# Patient Record
Sex: MALE | Race: WHITE | NOT HISPANIC OR LATINO | Employment: UNEMPLOYED | URBAN - NONMETROPOLITAN AREA
[De-identification: names, ages, dates, MRNs, and addresses within clinical notes are randomized per-mention and may not be internally consistent; named-entity substitution may affect disease eponyms.]

---

## 2017-05-03 ENCOUNTER — HOSPITAL ENCOUNTER (EMERGENCY)
Facility: HOSPITAL | Age: 57
Discharge: HOME | End: 2017-05-03
Attending: FAMILY MEDICINE | Admitting: FAMILY MEDICINE

## 2017-05-03 VITALS
BODY MASS INDEX: 20.3 KG/M2 | HEART RATE: 78 BPM | TEMPERATURE: 98.5 F | DIASTOLIC BLOOD PRESSURE: 78 MMHG | HEIGHT: 71 IN | RESPIRATION RATE: 18 BRPM | OXYGEN SATURATION: 98 % | WEIGHT: 145 LBS | SYSTOLIC BLOOD PRESSURE: 131 MMHG

## 2017-05-03 DIAGNOSIS — E16.2 HYPOGLYCEMIA: Primary | ICD-10-CM

## 2017-05-03 LAB
ALBUMIN SERPL-MCNC: 4.4 G/DL (ref 3.5–5)
ALBUMIN/GLOB SERPL: 1.5 G/DL (ref 1.5–2.5)
ALP SERPL-CCNC: 58 U/L (ref 40–129)
ALT SERPL W P-5'-P-CCNC: 16 U/L (ref 10–44)
ANION GAP SERPL CALCULATED.3IONS-SCNC: 4.2 MMOL/L (ref 3.6–11.2)
AST SERPL-CCNC: 21 U/L (ref 10–34)
BASOPHILS # BLD AUTO: 0.05 10*3/MM3 (ref 0–0.3)
BASOPHILS NFR BLD AUTO: 0.6 % (ref 0–2)
BILIRUB SERPL-MCNC: 0.3 MG/DL (ref 0.2–1.8)
BUN BLD-MCNC: 18 MG/DL (ref 7–21)
BUN/CREAT SERPL: 14.3 (ref 7–25)
CALCIUM SPEC-SCNC: 9.6 MG/DL (ref 7.7–10)
CHLORIDE SERPL-SCNC: 101 MMOL/L (ref 99–112)
CO2 SERPL-SCNC: 29.8 MMOL/L (ref 24.3–31.9)
CREAT BLD-MCNC: 1.26 MG/DL (ref 0.43–1.29)
DEPRECATED RDW RBC AUTO: 40.9 FL (ref 37–54)
EOSINOPHIL # BLD AUTO: 0.29 10*3/MM3 (ref 0–0.7)
EOSINOPHIL NFR BLD AUTO: 3.3 % (ref 0–5)
ERYTHROCYTE [DISTWIDTH] IN BLOOD BY AUTOMATED COUNT: 12.8 % (ref 11.5–14.5)
GFR SERPL CREATININE-BSD FRML MDRD: 60 ML/MIN/1.73
GLOBULIN UR ELPH-MCNC: 2.9 GM/DL
GLUCOSE BLD-MCNC: 170 MG/DL (ref 70–110)
HCT VFR BLD AUTO: 39.3 % (ref 42–52)
HGB BLD-MCNC: 13 G/DL (ref 14–18)
IMM GRANULOCYTES # BLD: 0.02 10*3/MM3 (ref 0–0.03)
IMM GRANULOCYTES NFR BLD: 0.2 % (ref 0–0.5)
LYMPHOCYTES # BLD AUTO: 1.92 10*3/MM3 (ref 1–3)
LYMPHOCYTES NFR BLD AUTO: 21.7 % (ref 21–51)
MCH RBC QN AUTO: 29.5 PG (ref 27–33)
MCHC RBC AUTO-ENTMCNC: 33.1 G/DL (ref 33–37)
MCV RBC AUTO: 89.1 FL (ref 80–94)
MONOCYTES # BLD AUTO: 0.48 10*3/MM3 (ref 0.1–0.9)
MONOCYTES NFR BLD AUTO: 5.4 % (ref 0–10)
NEUTROPHILS # BLD AUTO: 6.1 10*3/MM3 (ref 1.4–6.5)
NEUTROPHILS NFR BLD AUTO: 68.8 % (ref 30–70)
OSMOLALITY SERPL CALC.SUM OF ELEC: 276 MOSM/KG (ref 273–305)
PLATELET # BLD AUTO: 298 10*3/MM3 (ref 130–400)
PMV BLD AUTO: 9.3 FL (ref 6–10)
POTASSIUM BLD-SCNC: 4.7 MMOL/L (ref 3.5–5.3)
PROT SERPL-MCNC: 7.3 G/DL (ref 6–8)
RBC # BLD AUTO: 4.41 10*6/MM3 (ref 4.7–6.1)
SODIUM BLD-SCNC: 135 MMOL/L (ref 135–153)
WBC NRBC COR # BLD: 8.86 10*3/MM3 (ref 4.5–12.5)

## 2017-05-03 PROCEDURE — 82962 GLUCOSE BLOOD TEST: CPT

## 2017-05-03 PROCEDURE — 80053 COMPREHEN METABOLIC PANEL: CPT | Performed by: PHYSICIAN ASSISTANT

## 2017-05-03 PROCEDURE — 85025 COMPLETE CBC W/AUTO DIFF WBC: CPT | Performed by: PHYSICIAN ASSISTANT

## 2017-05-03 PROCEDURE — 99284 EMERGENCY DEPT VISIT MOD MDM: CPT

## 2017-05-03 PROCEDURE — 36415 COLL VENOUS BLD VENIPUNCTURE: CPT

## 2017-05-03 RX ORDER — SODIUM CHLORIDE 0.9 % (FLUSH) 0.9 %
10 SYRINGE (ML) INJECTION AS NEEDED
Status: DISCONTINUED | OUTPATIENT
Start: 2017-05-03 | End: 2017-05-03

## 2017-05-04 LAB — GLUCOSE BLDC GLUCOMTR-MCNC: 159 MG/DL (ref 70–130)

## 2018-02-19 ENCOUNTER — APPOINTMENT (EMERGENCY)
Dept: CT IMAGING | Facility: HOSPITAL | Age: 58
End: 2018-02-19
Payer: COMMERCIAL

## 2018-02-19 ENCOUNTER — HOSPITAL ENCOUNTER (OUTPATIENT)
Facility: HOSPITAL | Age: 58
Setting detail: OBSERVATION
Discharge: HOME | End: 2018-02-20
Attending: EMERGENCY MEDICINE | Admitting: EMERGENCY MEDICINE
Payer: COMMERCIAL

## 2018-02-19 ENCOUNTER — APPOINTMENT (EMERGENCY)
Dept: RADIOLOGY | Facility: HOSPITAL | Age: 58
End: 2018-02-19
Payer: COMMERCIAL

## 2018-02-19 DIAGNOSIS — J40 BRONCHITIS: ICD-10-CM

## 2018-02-19 DIAGNOSIS — K21.9 GASTROESOPHAGEAL REFLUX DISEASE WITHOUT ESOPHAGITIS: ICD-10-CM

## 2018-02-19 DIAGNOSIS — R05.9 COUGH: ICD-10-CM

## 2018-02-19 DIAGNOSIS — R07.9 CHEST PAIN: ICD-10-CM

## 2018-02-19 DIAGNOSIS — J98.01 BRONCHOSPASM: Primary | ICD-10-CM

## 2018-02-19 PROBLEM — R07.89 CHEST TIGHTNESS: Status: ACTIVE | Noted: 2018-02-19

## 2018-02-19 LAB
ALBUMIN SERPL BCP-MCNC: 3.5 G/DL (ref 3.5–5)
ALP SERPL-CCNC: 72 U/L (ref 46–116)
ALT SERPL W P-5'-P-CCNC: 39 U/L (ref 12–78)
ANION GAP SERPL CALCULATED.3IONS-SCNC: 9 MMOL/L (ref 4–13)
APTT PPP: 29 SECONDS (ref 23–35)
AST SERPL W P-5'-P-CCNC: 22 U/L (ref 5–45)
BASE EX.OXY STD BLDV CALC-SCNC: 78.5 % (ref 60–80)
BASE EXCESS BLDV CALC-SCNC: -0.6 MMOL/L
BASOPHILS # BLD AUTO: 0.03 THOUSANDS/ΜL (ref 0–0.1)
BASOPHILS NFR BLD AUTO: 1 % (ref 0–1)
BILIRUB SERPL-MCNC: 0.6 MG/DL (ref 0.2–1)
BUN SERPL-MCNC: 18 MG/DL (ref 5–25)
CALCIUM SERPL-MCNC: 8.6 MG/DL (ref 8.3–10.1)
CHLORIDE SERPL-SCNC: 106 MMOL/L (ref 100–108)
CK MB SERPL-MCNC: 1.4 % (ref 0–2.5)
CK MB SERPL-MCNC: 2.8 NG/ML (ref 0–5)
CK SERPL-CCNC: 200 U/L (ref 39–308)
CO2 SERPL-SCNC: 28 MMOL/L (ref 21–32)
CREAT SERPL-MCNC: 1.02 MG/DL (ref 0.6–1.3)
DEPRECATED D DIMER PPP: <270 NG/ML (FEU) (ref 0–424)
EOSINOPHIL # BLD AUTO: 0.39 THOUSAND/ΜL (ref 0–0.61)
EOSINOPHIL NFR BLD AUTO: 7 % (ref 0–6)
ERYTHROCYTE [DISTWIDTH] IN BLOOD BY AUTOMATED COUNT: 12.9 % (ref 11.6–15.1)
FLUAV AG SPEC QL: NORMAL
FLUBV AG SPEC QL: NORMAL
GFR SERPL CREATININE-BSD FRML MDRD: 82 ML/MIN/1.73SQ M
GLUCOSE SERPL-MCNC: 103 MG/DL (ref 65–140)
HCO3 BLDV-SCNC: 25 MMOL/L (ref 24–30)
HCT VFR BLD AUTO: 44.8 % (ref 36.5–49.3)
HGB BLD-MCNC: 15.4 G/DL (ref 12–17)
INR PPP: 0.92 (ref 0.86–1.16)
LACTATE SERPL-SCNC: 1.2 MMOL/L (ref 0.5–2)
LYMPHOCYTES # BLD AUTO: 1.92 THOUSANDS/ΜL (ref 0.6–4.47)
LYMPHOCYTES NFR BLD AUTO: 34 % (ref 14–44)
MCH RBC QN AUTO: 29.2 PG (ref 26.8–34.3)
MCHC RBC AUTO-ENTMCNC: 34.4 G/DL (ref 31.4–37.4)
MCV RBC AUTO: 85 FL (ref 82–98)
MONOCYTES # BLD AUTO: 0.55 THOUSAND/ΜL (ref 0.17–1.22)
MONOCYTES NFR BLD AUTO: 10 % (ref 4–12)
NEUTROPHILS # BLD AUTO: 2.78 THOUSANDS/ΜL (ref 1.85–7.62)
NEUTS SEG NFR BLD AUTO: 48 % (ref 43–75)
NT-PROBNP SERPL-MCNC: 18 PG/ML
O2 CT BLDV-SCNC: 17.6 ML/DL
PCO2 BLDV: 44.4 MM HG (ref 42–50)
PH BLDV: 7.37 [PH] (ref 7.3–7.4)
PLATELET # BLD AUTO: 255 THOUSANDS/UL (ref 149–390)
PMV BLD AUTO: 9.8 FL (ref 8.9–12.7)
PO2 BLDV: 42.8 MM HG (ref 35–45)
POTASSIUM SERPL-SCNC: 4.2 MMOL/L (ref 3.5–5.3)
PROT SERPL-MCNC: 6.8 G/DL (ref 6.4–8.2)
PROTHROMBIN TIME: 12.6 SECONDS (ref 12.1–14.4)
RBC # BLD AUTO: 5.28 MILLION/UL (ref 3.88–5.62)
RSV B RNA SPEC QL NAA+PROBE: NORMAL
SODIUM SERPL-SCNC: 143 MMOL/L (ref 136–145)
TROPONIN I SERPL-MCNC: <0.02 NG/ML
WBC # BLD AUTO: 5.67 THOUSAND/UL (ref 4.31–10.16)

## 2018-02-19 PROCEDURE — 99220 PR INITIAL OBSERVATION CARE/DAY 70 MINUTES: CPT | Performed by: INTERNAL MEDICINE

## 2018-02-19 PROCEDURE — 94660 CPAP INITIATION&MGMT: CPT

## 2018-02-19 PROCEDURE — 85025 COMPLETE CBC W/AUTO DIFF WBC: CPT | Performed by: EMERGENCY MEDICINE

## 2018-02-19 PROCEDURE — 71046 X-RAY EXAM CHEST 2 VIEWS: CPT

## 2018-02-19 PROCEDURE — 87798 DETECT AGENT NOS DNA AMP: CPT | Performed by: INTERNAL MEDICINE

## 2018-02-19 PROCEDURE — 84484 ASSAY OF TROPONIN QUANT: CPT | Performed by: EMERGENCY MEDICINE

## 2018-02-19 PROCEDURE — 82553 CREATINE MB FRACTION: CPT | Performed by: EMERGENCY MEDICINE

## 2018-02-19 PROCEDURE — 87040 BLOOD CULTURE FOR BACTERIA: CPT | Performed by: EMERGENCY MEDICINE

## 2018-02-19 PROCEDURE — 36415 COLL VENOUS BLD VENIPUNCTURE: CPT | Performed by: EMERGENCY MEDICINE

## 2018-02-19 PROCEDURE — 71275 CT ANGIOGRAPHY CHEST: CPT

## 2018-02-19 PROCEDURE — 85730 THROMBOPLASTIN TIME PARTIAL: CPT | Performed by: EMERGENCY MEDICINE

## 2018-02-19 PROCEDURE — 83605 ASSAY OF LACTIC ACID: CPT | Performed by: EMERGENCY MEDICINE

## 2018-02-19 PROCEDURE — 84484 ASSAY OF TROPONIN QUANT: CPT | Performed by: INTERNAL MEDICINE

## 2018-02-19 PROCEDURE — 82550 ASSAY OF CK (CPK): CPT | Performed by: EMERGENCY MEDICINE

## 2018-02-19 PROCEDURE — 94760 N-INVAS EAR/PLS OXIMETRY 1: CPT

## 2018-02-19 PROCEDURE — 85610 PROTHROMBIN TIME: CPT | Performed by: EMERGENCY MEDICINE

## 2018-02-19 PROCEDURE — 83880 ASSAY OF NATRIURETIC PEPTIDE: CPT | Performed by: EMERGENCY MEDICINE

## 2018-02-19 PROCEDURE — 80053 COMPREHEN METABOLIC PANEL: CPT | Performed by: EMERGENCY MEDICINE

## 2018-02-19 PROCEDURE — 96365 THER/PROPH/DIAG IV INF INIT: CPT

## 2018-02-19 PROCEDURE — 94640 AIRWAY INHALATION TREATMENT: CPT

## 2018-02-19 PROCEDURE — 84484 ASSAY OF TROPONIN QUANT: CPT | Performed by: PHYSICIAN ASSISTANT

## 2018-02-19 PROCEDURE — 82805 BLOOD GASES W/O2 SATURATION: CPT | Performed by: EMERGENCY MEDICINE

## 2018-02-19 PROCEDURE — 96375 TX/PRO/DX INJ NEW DRUG ADDON: CPT

## 2018-02-19 PROCEDURE — 93005 ELECTROCARDIOGRAM TRACING: CPT

## 2018-02-19 PROCEDURE — 99285 EMERGENCY DEPT VISIT HI MDM: CPT

## 2018-02-19 PROCEDURE — 85379 FIBRIN DEGRADATION QUANT: CPT | Performed by: EMERGENCY MEDICINE

## 2018-02-19 RX ORDER — FLUTICASONE PROPIONATE 50 MCG
1 SPRAY, SUSPENSION (ML) NASAL DAILY
Status: DISCONTINUED | OUTPATIENT
Start: 2018-02-20 | End: 2018-02-20

## 2018-02-19 RX ORDER — HYDROCHLOROTHIAZIDE 12.5 MG/1
12.5 TABLET ORAL DAILY
Status: DISCONTINUED | OUTPATIENT
Start: 2018-02-20 | End: 2018-02-20 | Stop reason: HOSPADM

## 2018-02-19 RX ORDER — LEVALBUTEROL INHALATION SOLUTION 0.63 MG/3ML
0.63 SOLUTION RESPIRATORY (INHALATION) EVERY 4 HOURS PRN
Status: DISCONTINUED | OUTPATIENT
Start: 2018-02-19 | End: 2018-02-20 | Stop reason: HOSPADM

## 2018-02-19 RX ORDER — AZITHROMYCIN 250 MG/1
250 TABLET, FILM COATED ORAL EVERY 24 HOURS
Status: DISCONTINUED | OUTPATIENT
Start: 2018-02-20 | End: 2018-02-20

## 2018-02-19 RX ORDER — BENZONATATE 100 MG/1
100 CAPSULE ORAL 3 TIMES DAILY PRN
Status: DISCONTINUED | OUTPATIENT
Start: 2018-02-19 | End: 2018-02-20 | Stop reason: HOSPADM

## 2018-02-19 RX ORDER — ALBUTEROL SULFATE 2.5 MG/3ML
5 SOLUTION RESPIRATORY (INHALATION) ONCE
Status: COMPLETED | OUTPATIENT
Start: 2018-02-19 | End: 2018-02-19

## 2018-02-19 RX ORDER — ASPIRIN 81 MG/1
162 TABLET, CHEWABLE ORAL ONCE
Status: COMPLETED | OUTPATIENT
Start: 2018-02-19 | End: 2018-02-19

## 2018-02-19 RX ORDER — GUAIFENESIN 600 MG
600 TABLET, EXTENDED RELEASE 12 HR ORAL EVERY 12 HOURS SCHEDULED
Status: DISCONTINUED | OUTPATIENT
Start: 2018-02-19 | End: 2018-02-20

## 2018-02-19 RX ORDER — AZITHROMYCIN 250 MG/1
500 TABLET, FILM COATED ORAL ONCE
Status: COMPLETED | OUTPATIENT
Start: 2018-02-19 | End: 2018-02-19

## 2018-02-19 RX ORDER — HYDROCHLOROTHIAZIDE 12.5 MG/1
12.5 TABLET ORAL DAILY
COMMUNITY

## 2018-02-19 RX ORDER — METHYLPREDNISOLONE SODIUM SUCCINATE 125 MG/2ML
125 INJECTION, POWDER, LYOPHILIZED, FOR SOLUTION INTRAMUSCULAR; INTRAVENOUS ONCE
Status: COMPLETED | OUTPATIENT
Start: 2018-02-19 | End: 2018-02-19

## 2018-02-19 RX ORDER — ACETAMINOPHEN 325 MG/1
650 TABLET ORAL EVERY 6 HOURS PRN
Status: DISCONTINUED | OUTPATIENT
Start: 2018-02-19 | End: 2018-02-20 | Stop reason: HOSPADM

## 2018-02-19 RX ORDER — ALBUTEROL SULFATE 2.5 MG/3ML
2.5 SOLUTION RESPIRATORY (INHALATION) EVERY 6 HOURS PRN
Status: DISCONTINUED | OUTPATIENT
Start: 2018-02-19 | End: 2018-02-19

## 2018-02-19 RX ADMIN — ALBUTEROL SULFATE 5 MG: 2.5 SOLUTION RESPIRATORY (INHALATION) at 09:54

## 2018-02-19 RX ADMIN — ALBUTEROL SULFATE 5 MG: 2.5 SOLUTION RESPIRATORY (INHALATION) at 07:18

## 2018-02-19 RX ADMIN — IOHEXOL 85 ML: 350 INJECTION, SOLUTION INTRAVENOUS at 08:27

## 2018-02-19 RX ADMIN — ACETAMINOPHEN 650 MG: 325 TABLET, FILM COATED ORAL at 19:39

## 2018-02-19 RX ADMIN — ASPIRIN 81 MG 162 MG: 81 TABLET ORAL at 07:16

## 2018-02-19 RX ADMIN — GUAIFENESIN 600 MG: 600 TABLET, EXTENDED RELEASE ORAL at 21:19

## 2018-02-19 RX ADMIN — CEFTRIAXONE 1000 MG: 1 INJECTION, SOLUTION INTRAVENOUS at 08:08

## 2018-02-19 RX ADMIN — METHYLPREDNISOLONE SODIUM SUCCINATE 125 MG: 125 INJECTION, POWDER, FOR SOLUTION INTRAMUSCULAR; INTRAVENOUS at 08:07

## 2018-02-19 RX ADMIN — SODIUM CHLORIDE 1000 ML: 0.9 INJECTION, SOLUTION INTRAVENOUS at 08:07

## 2018-02-19 RX ADMIN — AZITHROMYCIN 500 MG: 250 TABLET, FILM COATED ORAL at 08:08

## 2018-02-19 NOTE — ED PROVIDER NOTES
History  Chief Complaint   Patient presents with    Shortness of Breath     Pt presents to ED for evaluation an dtreatment of shortness of breath and chest tightness for past 2 months  Has been treated with 2 different antiobiotics with no relief of symptoms  Symptoms worse at night     Patient is a 56year old male with 2 months of worsening shortness of breath and cough with green sputum  No travel  (+) pleuritic chest pain  No N/V  No fever  States he has been on two different abx without relief (one of them he believes was augmentin and he does not remember the other abx)  Smokes cigars  Was last seen in this ED on 2/21/15 for angioedema  [facility] website checked on this patient and no Rx found  No h/o asthma or COPD  History provided by:  Patient and spouse   used: No    Shortness of Breath   Associated symptoms: chest pain (pleuritic) and cough    Associated symptoms: no fever and no vomiting        None       Past Medical History:   Diagnosis Date    GERD (gastroesophageal reflux disease)     Renal disorder     kidney stones       Past Surgical History:   Procedure Laterality Date    APPENDECTOMY      HERNIA REPAIR         No family history on file  I have reviewed and agree with the history as documented  Social History   Substance Use Topics    Smoking status: Current Every Day Smoker     Packs/day: 0 00     Types: Cigars    Smokeless tobacco: Never Used    Alcohol use 1 8 oz/week     3 Cans of beer per week        Review of Systems   Constitutional: Negative for fever  Respiratory: Positive for cough and shortness of breath  Cardiovascular: Positive for chest pain (pleuritic)  Gastrointestinal: Negative for nausea and vomiting  All other systems reviewed and are negative        Physical Exam  ED Triage Vitals   Temperature Pulse Respirations Blood Pressure SpO2   02/19/18 0720 02/19/18 0618 02/19/18 0618 02/19/18 0618 02/19/18 0618   98 8 °F (37 1 °C) 66 16 125/68 97 %      Temp Source Heart Rate Source Patient Position - Orthostatic VS BP Location FiO2 (%)   02/19/18 0720 02/19/18 0618 02/19/18 0618 02/19/18 0618 --   Oral Monitor Lying Right arm       Pain Score       02/19/18 0618       2           Orthostatic Vital Signs  Vitals:    02/19/18 0618   BP: 125/68   Pulse: 66   Patient Position - Orthostatic VS: Lying       Physical Exam   Constitutional: He is oriented to person, place, and time  He appears well-developed and well-nourished  He appears distressed (moderate)  HENT:   Head: Normocephalic and atraumatic  Mouth/Throat: Oropharynx is clear and moist    Eyes: No scleral icterus  Neck: Normal range of motion  Neck supple  No JVD present  Cardiovascular: Normal rate, regular rhythm and normal heart sounds  No murmur heard  Pulmonary/Chest: No stridor  He is in respiratory distress  He has wheezes  He has no rales  He exhibits no tenderness  Abdominal: Soft  Bowel sounds are normal  There is no tenderness  Musculoskeletal: He exhibits no edema, tenderness (No calf tenderness) or deformity  Neurological: He is alert and oriented to person, place, and time  Skin: Skin is warm and dry  No rash noted  Psychiatric: He has a normal mood and affect  Nursing note and vitals reviewed        ED Medications  Medications   sodium chloride 0 9 % bolus 1,000 mL (1,000 mL Intravenous New Bag 2/19/18 0807)   albuterol inhalation solution 5 mg (not administered)   albuterol inhalation solution 5 mg (5 mg Nebulization Given 2/19/18 0718)   aspirin chewable tablet 162 mg (162 mg Oral Given 2/19/18 0716)   methylPREDNISolone sodium succinate (Solu-MEDROL) injection 125 mg (125 mg Intravenous Given 2/19/18 0807)   ceftriaxone (ROCEPHIN) 1 g/50 mL in dextrose IVPB (1,000 mg Intravenous New Bag 2/19/18 0808)   azithromycin (ZITHROMAX) tablet 500 mg (500 mg Oral Given 2/19/18 0808)   iohexol (OMNIPAQUE) 350 MG/ML injection (MULTI-DOSE) 100 mL (85 mL Intravenous Given 2/19/18 0827)       Diagnostic Studies  Results Reviewed     Procedure Component Value Units Date/Time    Influenza A/B and RSV by PCR (Indicated for patients > 2 mo of age) [[id number]]     Lab Status:  No result Specimen:  Nasopharyngeal     B-type natriuretic peptide [[id number]]  (Normal) Collected:  02/19/18 0655    Lab Status:  Final result Specimen:  Blood from Arm, Right Updated:  02/19/18 0758     NT-proBNP 18 pg/mL     CKMB [[id number]]  (Normal) Collected:  02/19/18 0655    Lab Status:  Final result Specimen:  Blood from Arm, Right Updated:  02/19/18 0758     CK-MB Index 1 4 %      CK-MB FRACTION 2 8 ng/mL     CK Total with Reflex CKMB [[id number]]  (Normal) Collected:  02/19/18 0655    Lab Status:  Final result Specimen:  Blood from Arm, Right Updated:  02/19/18 0758     Total  U/L     Troponin I [[id number]]  (Normal) Collected:  02/19/18 0655    Lab Status:  Final result Specimen:  Blood from Arm, Right Updated:  02/19/18 0737     Troponin I <0 02 ng/mL     Narrative:         [employer] Chemistry analyzer 99% cutoff is > 0 04 ng/mL in network labs    o cTnI 99% cutoff is useful only when applied to patients in the clinical setting of myocardial ischemia  o cTnI 99% cutoff should be interpreted in the context of clinical history, ECG findings and possibly cardiac imaging to establish correct diagnosis  o cTnI 99% cutoff may be suggestive but clearly not indicative of a coronary event without the clinical setting of myocardial ischemia      Comprehensive metabolic panel [[id number]] Collected:  02/19/18 0655    Lab Status:  Final result Specimen:  Blood from Arm, Right Updated:  02/19/18 0731     Sodium 143 mmol/L      Potassium 4 2 mmol/L      Chloride 106 mmol/L      CO2 28 mmol/L      Anion Gap 9 mmol/L      BUN 18 mg/dL      Creatinine 1 02 mg/dL      Glucose 103 mg/dL      Calcium 8 6 mg/dL      AST 22 U/L      ALT 39 U/L      Alkaline Phosphatase 72 U/L      Total Protein 6 8 g/dL Albumin 3 5 g/dL      Total Bilirubin 0 60 mg/dL      eGFR 82 ml/min/1 73sq m     Narrative:         National Kidney Disease Education Program recommendations are as follows:  GFR calculation is accurate only with a steady state creatinine  Chronic Kidney disease less than 60 ml/min/1 73 sq  meters  Kidney failure less than 15 ml/min/1 73 sq  meters  Lactic acid, plasma [[id number]]  (Normal) Collected:  02/19/18 0655    Lab Status:  Final result Specimen:  Blood from Arm, Right Updated:  02/19/18 0724     LACTIC ACID 1 2 mmol/L     Narrative:         Result may be elevated if tourniquet was used during collection  D-Dimer [[id number]]  (Normal) Collected:  02/19/18 0655    Lab Status:  Final result Specimen:  Blood from Arm, Right Updated:  02/19/18 0718     D-Dimer, Quant <270 ng/ml (FEU)     Protime-INR [[id number]]  (Normal) Collected:  02/19/18 0655    Lab Status:  Final result Specimen:  Blood from Arm, Right Updated:  02/19/18 0714     Protime 12 6 seconds      INR 0 92    APTT [[id number]]  (Normal) Collected:  02/19/18 0655    Lab Status:  Final result Specimen:  Blood from Arm, Right Updated:  02/19/18 0714     PTT 29 seconds     Narrative: Therapeutic Heparin Range = 60-90 seconds    Blood gas, venous [[id number]]  (Normal) Collected:  02/19/18 0655    Lab Status:  Final result Specimen:  Blood from Arm, Right Updated:  02/19/18 0714     pH, Anthony 7 368     pCO2, Anthony 44 4 mm Hg      pO2, Anthony 42 8 mm Hg      HCO3, Anthony 25 0 mmol/L      Base Excess, Anthony -0 6 mmol/L      O2 Content, Anthony 17 6 ml/dL      O2 HGB, VENOUS 78 5 %     Blood culture #1 [[id number]] Collected:  02/19/18 0706    Lab Status:   In process Specimen:  Blood from Hand, Right Updated:  02/19/18 0710    CBC and differential [[id number]]  (Abnormal) Collected:  02/19/18 0655    Lab Status:  Final result Specimen:  Blood from Arm, Right Updated:  02/19/18 0706     WBC 5 67 Thousand/uL      RBC 5 28 Million/uL      Hemoglobin 15 4 g/dL Hematocrit 44 8 %      MCV 85 fL      MCH 29 2 pg      MCHC 34 4 g/dL      RDW 12 9 %      MPV 9 8 fL      Platelets 058 Thousands/uL      Neutrophils Relative 48 %      Lymphocytes Relative 34 %      Monocytes Relative 10 %      Eosinophils Relative 7 (H) %      Basophils Relative 1 %      Neutrophils Absolute 2 78 Thousands/µL      Lymphocytes Absolute 1 92 Thousands/µL      Monocytes Absolute 0 55 Thousand/µL      Eosinophils Absolute 0 39 Thousand/µL      Basophils Absolute 0 03 Thousands/µL     Blood culture #2 [[id number]] Collected:  02/19/18 0655    Lab Status: In process Specimen:  Blood from Arm, Right Updated:  02/19/18 9961                 CTA ED chest PE study   ED Interpretation by Oswaldo Blunt MD (02/19 0850)   FINDINGS:      PULMONARY ARTERIAL TREE:  No pulmonary embolus is seen  LUNGS:  Lungs are clear   There is no tracheal or endobronchial lesion  PLEURA:  Unremarkable  HEART/AORTA:  Heart is not enlarged   No pericardial effusion   Minimal aortic and coronary artery calcification  MEDIASTINUM AND KRYSTYNA:  Unremarkable  CHEST WALL AND LOWER NECK:   Unremarkable  VISUALIZED STRUCTURES IN THE UPPER ABDOMEN:  2 7 cm simple cyst right lobe of liver  OSSEOUS STRUCTURES:  No acute fracture or destructive osseous lesion   Mild thoracic spondylosis   Mild degenerative changes bilateral glenohumeral and acromioclavicular joints  Impression:        No pulmonary embolus  No CT evidence of acute thoracic process  Minimal aortic and coronary artery calcification  Workstation performed: [id number]         Final Result by Roshni Infante MD (02/19 1533)      No pulmonary embolus  No CT evidence of acute thoracic process  Minimal aortic and coronary artery calcification  Workstation performed: [id number]         XR chest 2 views   ED Interpretation by Oswaldo Blunt MD (02/19 0700)   ? retrocardiac infiltrate read by me  Final Result by Samantha Hoyt MD (02/19 5554)      No acute cardiopulmonary disease  Workstation performed: [id number]                    Procedures  ECG 12 Lead Documentation  Date/Time: 2/19/2018 6:26 AM  Performed by: Clayton Phan  Authorized by: Clayton Phan     Indications / Diagnosis:  Chest pain, sob  ECG reviewed by me, the ED Provider: yes    Patient location:  ED  Rate:     ECG rate:  62    ECG rate assessment: normal    Rhythm:     Rhythm: sinus rhythm    Ectopy:     Ectopy: none    QRS:     QRS axis:  Normal    QRS intervals:  Normal  Conduction:     Conduction: normal    ST segments:     ST segments:  Normal  T waves:     T waves: normal             Phone Contacts  ED Phone Contact    ED Course  ED Course as of Feb 19 0900   Mon Feb 19, 2018   1910 Patient feeling better with neb tx      8222 Patient still wheezing so more albuterol neb ordered             HEART Risk Score    Flowsheet Row Most Recent Value   History  2 Filed at: 02/19/2018 0850   ECG  0 Filed at: 02/19/2018 0850   Age  1 Filed at: 02/19/2018 0850   Risk Factors  1 Filed at: 02/19/2018 0850   Troponin  0 Filed at: 02/19/2018 0850   Heart Score Risk Calculator   History  2 Filed at: 02/19/2018 0850   ECG  0 Filed at: 02/19/2018 0850   Age  1 Filed at: 02/19/2018 0850   Risk Factors  1 Filed at: 02/19/2018 0850   Troponin  0 Filed at: 02/19/2018 0850   HEART Score  4 Filed at: 02/19/2018 0850   HEART Score  4 Filed at: 02/19/2018 0850                    MARIA EUGENIA Risk Score    Flowsheet Row Most Recent Value   Age >= 72  0 Filed at: 02/19/2018 0851   Known CAD (stenosis >= 50%)  0 Filed at: 02/19/2018 0851   Recent (<=24 hrs) Service Angina  0 Filed at: 02/19/2018 0851   ST Deviation >= 0 5 mm  0 Filed at: 02/19/2018 0851   3+ CAD Risk Factors (FHx, HTN, HLP, DM, Smoker)  0 Filed at: 02/19/2018 0851   Aspirin Use Past 7 Days  0 Filed at: 02/19/2018 0851   Elevated Cardiac Markers  0 Filed at: 02/19/2018 0851   MARIA EUGENIA Risk Score (Calculated)  0 Filed at: 02/19/2018 1278              MDM  Number of Diagnoses or Management Options  Diagnosis management comments: DDX including but not limited to: pneumonia, bronchitis, pleural effusion, CHF, PE, PTX, ACS, MI, asthma exacerbation, COPD exacerbation, anemia, metabolic abnormality, renal failure  Doubt influenza, dissection, rhabdomyolysis or GI etiology  Amount and/or Complexity of Data Reviewed  Clinical lab tests: ordered and reviewed  Tests in the radiology section of CPT®: ordered and reviewed  Decide to obtain previous medical records or to obtain history from someone other than the patient: yes  Obtain history from someone other than the patient: yes  Review and summarize past medical records: yes  Independent visualization of images, tracings, or specimens: yes      CritCare Time    Disposition  Final diagnoses:   Bronchospasm   Bronchitis   Chest pain     Time reflects when diagnosis was documented in both MDM as applicable and the Disposition within this note     Time User Action Codes Description Comment    2/19/2018  8:59 AM Melina Solis Add [J98 01] Bronchospasm     2/19/2018  8:59 AM Melina Solis Add [J40] Bronchitis     2/19/2018  8:59 AM Melina Solis Add [R07 9] Chest pain       ED Disposition     ED Disposition Condition Comment    Admit  Case was discussed with Dr Francia Tinoco and the patient's admission status was agreed to be Admission Status: observation status to the service of Dr Francia Tinoco   Follow-up Information    None       Patient's Medications    No medications on file     No discharge procedures on file      ED Provider  Electronically Signed by           Giovanna Arredondo MD  02/19/18 0916

## 2018-02-19 NOTE — H&P
H&P- Samm Sheikh 1960, 56 y o  male MRN: [id number]    Unit/Bed#: ED 05 Encounter: [id number]    Primary Care Provider: Rena Oneal MD   Date and time admitted to hospital: 2/19/2018  6:04 AM        * Cough   Assessment & Plan    · Cough and SOB x 2 months despite multiple antibiotics, albuterol inhaler and p o  steroids as an outpatient  · Possible COPD  · Patient reports smoking 1-2 cigars daily over the past 1 year and also notes second hand smoke exposure  · Patient received Solu-Medrol 125 mg in the ED as well as nebulizer treatments and has since noticed some improvement in his symptoms  Patient also received 1 dose of p o  azithromycin and IV ceftriaxone  · Chest x-ray: No acute cardiopulmonary disease  · CTA chest: No pulmonary embolus  No CT evidence of acute thoracic process  Minimal aortic and coronary artery calcification  · Influenza and RSV by PCR negative  · No leukocytosis, patient afebrile  · No wheezing noted at this time  · Hold off on further steroids  · Respiratory protocol  Continue albuterol nebulizer treatments prn  · Obtain sputum culture  · Continue p o  azithromycin  · Pulmonology consult, appreciate input  · Patient will likely need outpatient PFTs  · Pulse ox stable on RA  · Mucinex b i d  and Tessalon Perles prn  Chest tightness   Assessment & Plan    · Patient denies chest pain, notes tightness  Possibly due to suspected COPD  · Received 1 dose of Solu-Medrol in ED  No wheezing noted on exam  No further steroids at this time  · Respiratory protocol  · Obtain troponin x 3   · Obtain echocardiogram          LISA on CPAP   Assessment & Plan    · Continue CPAP HS  Depression   Assessment & Plan    · Continue Rexulti  GERD (gastroesophageal reflux disease)   Assessment & Plan    · Mylanta prn  History of kidney stones   Assessment & Plan    · Patient reports taking HCTZ daily for his h/o kidney stones  VTE Prophylaxis: low risk  Ambulate patient  Code Status: Level 1- Full Code  POLST: POLST form is not discussed and not completed at this time  Anticipated Length of Stay:  Patient will be admitted on an Observation basis with an anticipated length of stay of  < 2 midnights  Justification for Hospital Stay: Cough, chest tightness  Total Time for Visit, including Counseling / Coordination of Care: 45 minutes  Greater than 50% of this total time spent on direct patient counseling and coordination of care  Chief Complaint:   Shortness of breath, cough    History of Present Illness:    Alondra Chaudhry is a 56 y o  male with a history of LISA on CPAP, kidney stones and depression who presents with cough and chest tightness  Patient reports cold-like symptoms over the past 2 months, including SOB, chest tightness, wheezing and cough  He notes that he has been treated with about 4-5 antibiotics, a course of p o  steroids and has been using an albuterol inhaler over the past 2 months without improvement in his symptoms  He reports that his cough has been productive of greenish, yellowish, white sputum  He admits to post nasal drip and has been using a nasal steroid  Patient notes that his cough worsens at night and that he has had difficulty sleeping due to increased SOB and coughing at night  Patient has a history of LSIA and reports compliance with CPAP  He denies chest pain but reports tightness and has noticed that he has been wheezing intermittently  He denies dyspnea on exertion and reports that he is still able to do his usual cardiac exercises on the treadmill  He denies fevers/ chills  Patient reports that he was unable to sleep last night due to his SOB which prompted him to come to the hospital     On arrival to the hospital, blood work was obtained, D-dimer, troponin, lactic acid and BNP were within normal limits   Chest x-ray showed no acute findings and CTA chest was negative for acute thoracic process and negative for PE  EKG was normal sinus rhythm  Per ED reports, patient was noted to have wheezing on exam  He received Solu-Medrol 125 mg IV and albuterol nebulizer treatments  He also received 1 dose of azithromycin and ceftriaxone  He has noticed improvement in his symptoms since presentation  Influenza and RSV are negative  He notes that he smokes 1-2 cigars daily over the past 1 year and had second hand smoke as a kid, his father reportedly smoked  He reports that he had a normal exercise stress test about 5-10 years ago  Review of Systems:    Review of Systems   HENT: Positive for postnasal drip  Respiratory: Positive for cough, chest tightness, shortness of breath and wheezing  All other systems reviewed and are negative  Past Medical and Surgical History:     Past Medical History:   Diagnosis Date    Depression     GERD (gastroesophageal reflux disease)     History of kidney stones     kidney stones    LISA on CPAP        Past Surgical History:   Procedure Laterality Date    APPENDECTOMY      HERNIA REPAIR         Meds/Allergies:    Prior to Admission medications    Medication Sig Start Date End Date Taking? Authorizing Provider   Brexpiprazole (REXULTI) 1 MG TABS Take 1 mg by mouth daily   Yes Historical Provider, MD   hydrochlorothiazide (HYDRODIURIL) 12 5 mg tablet Take 12 5 mg by mouth daily   Yes Historical Provider, MD     I have reviewed home medications with patient personally      Allergies: No Known Allergies    Social History:     Marital Status: /Civil Union   Occupation:    Patient Pre-hospital Living Situation: Lives at home with family  Patient Pre-hospital Level of Mobility: Ambulates without assistance  Patient Pre-hospital Diet Restrictions: none  Substance Use History:   History   Alcohol Use    1 8 oz/week    3 Cans of beer per week     History   Smoking Status    Current Every Day Smoker    Packs/day: 0 00    Types: Cigars Smokeless Tobacco    Never Used     Comment: 1-2 cigars daily over the past 1 year     History   Drug Use No       Family History:    Family History   Problem Relation Age of Onset    Heart attack Maternal Grandmother        Physical Exam:     Vitals:   Blood Pressure: 166/85 (02/19/18 1515)  Pulse: 100 (02/19/18 1501)  Temperature: 98 8 °F (37 1 °C) (02/19/18 0720)  Temp Source: Oral (02/19/18 0720)  Respirations: 17 (02/19/18 1501)  Height: 5' 8 5" (174 cm) (02/19/18 0618)  Weight - Scale: 115 kg (253 lb) (02/19/18 0618)  SpO2: 94 % (02/19/18 1501)    Physical Exam   Constitutional: He is oriented to person, place, and time  No distress  HENT:   Head: Normocephalic and atraumatic  Eyes: Conjunctivae are normal    Neck: Neck supple  Cardiovascular: Normal rate and regular rhythm  Pulmonary/Chest: Effort normal  No respiratory distress  He has decreased breath sounds  He has no wheezes  Abdominal: Soft  Bowel sounds are normal  There is no tenderness  Musculoskeletal: Normal range of motion  He exhibits no edema  Neurological: He is alert and oriented to person, place, and time  Skin: Skin is warm and dry  He is not diaphoretic  No erythema  Psychiatric: He has a normal mood and affect  His behavior is normal    Nursing note and vitals reviewed  Additional Data:     Lab Results: I have personally reviewed pertinent reports          Results from last 7 days  Lab Units 02/19/18  0655   WBC Thousand/uL 5 67   HEMOGLOBIN g/dL 15 4   HEMATOCRIT % 44 8   PLATELETS Thousands/uL 255   NEUTROS PCT % 48   LYMPHS PCT % 34   MONOS PCT % 10   EOS PCT % 7*       Results from last 7 days  Lab Units 02/19/18  0655   SODIUM mmol/L 143   POTASSIUM mmol/L 4 2   CHLORIDE mmol/L 106   CO2 mmol/L 28   BUN mg/dL 18   CREATININE mg/dL 1 02   CALCIUM mg/dL 8 6   TOTAL PROTEIN g/dL 6 8   BILIRUBIN TOTAL mg/dL 0 60   ALK PHOS U/L 72   ALT U/L 39   AST U/L 22   GLUCOSE RANDOM mg/dL 103       Results from last 7 days  Lab Units 02/19/18  0655   INR  0 92       Imaging: I have personally reviewed pertinent reports  CTA ED chest PE study   ED Interpretation by Mathew Jung MD (02/19 6992)   FINDINGS:      PULMONARY ARTERIAL TREE:  No pulmonary embolus is seen  LUNGS:  Lungs are clear   There is no tracheal or endobronchial lesion  PLEURA:  Unremarkable  HEART/AORTA:  Heart is not enlarged   No pericardial effusion   Minimal aortic and coronary artery calcification  MEDIASTINUM AND KRYSTYNA:  Unremarkable  CHEST WALL AND LOWER NECK:   Unremarkable  VISUALIZED STRUCTURES IN THE UPPER ABDOMEN:  2 7 cm simple cyst right lobe of liver  OSSEOUS STRUCTURES:  No acute fracture or destructive osseous lesion   Mild thoracic spondylosis   Mild degenerative changes bilateral glenohumeral and acromioclavicular joints  Impression:        No pulmonary embolus  No CT evidence of acute thoracic process  Minimal aortic and coronary artery calcification  Workstation performed: [id number]         Final Result by Susan White MD (02/19 0182)      No pulmonary embolus  No CT evidence of acute thoracic process  Minimal aortic and coronary artery calcification  Workstation performed: [id number]         XR chest 2 views   ED Interpretation by Mathew Jung MD (02/19 0700)   ? retrocardiac infiltrate read by me  Final Result by Gay Denver, MD (02/19 0217)      No acute cardiopulmonary disease  Workstation performed: [id number]             EKG, Pathology, and Other Studies Reviewed on Admission:   · EKG: normal sinus rhythm     Allscripts / Epic Records Reviewed: Yes     ** Please Note: This note has been constructed using a voice recognition system   **

## 2018-02-19 NOTE — ASSESSMENT & PLAN NOTE
· Patient denies chest pain, notes tightness  Possibly due to suspected COPD  · Received 1 dose of Solu-Medrol in ED  No wheezing noted on exam  No further steroids at this time  · Respiratory protocol     · Obtain troponin x 3   · Obtain echocardiogram

## 2018-02-19 NOTE — ASSESSMENT & PLAN NOTE
· Cough and SOB x 2 months despite multiple antibiotics, albuterol inhaler and p o  steroids as an outpatient  · Possible COPD  · Patient reports smoking 1-2 cigars daily over the past 1 year and also notes second hand smoke exposure  · Patient received Solu-Medrol 125 mg in the ED as well as nebulizer treatments and has since noticed some improvement in his symptoms  Patient also received 1 dose of p o  azithromycin and IV ceftriaxone  · Chest x-ray: No acute cardiopulmonary disease  · CTA chest: No pulmonary embolus  No CT evidence of acute thoracic process  Minimal aortic and coronary artery calcification  · Influenza and RSV by PCR negative  · No leukocytosis, patient afebrile  · No wheezing noted at this time  · Hold off on further steroids  · Respiratory protocol  Continue albuterol nebulizer treatments prn  · Obtain sputum culture  · Continue p o  azithromycin  · Pulmonology consult, appreciate input  · Patient will likely need outpatient PFTs  · Pulse ox stable on RA  · Mucinex b i d  and Tessalon Perles prn

## 2018-02-19 NOTE — RESPIRATORY THERAPY NOTE
RT Protocol Note  Niles Koenig 56 y o  male MRN: [id number]  Unit/Bed#: -01 Encounter: [id number]    Assessment    Principal Problem:    Cough  Active Problems:    History of kidney stones    GERD (gastroesophageal reflux disease)    Depression    Chest tightness    ILSA on CPAP      Home Pulmonary Medications:  PRN MDI- believes it is albuterol     Past Medical History:   Diagnosis Date    Depression     GERD (gastroesophageal reflux disease)     History of kidney stones     kidney stones    LISA on CPAP      Social History     Social History    Marital status: /Civil Union     Spouse name: N/A    Number of children: N/A    Years of education: N/A     Social History Main Topics    Smoking status: Current Every Day Smoker     Packs/day: 0 00     Types: Cigars    Smokeless tobacco: Never Used      Comment: 1-2 cigars daily over the past 1 year    Alcohol use 1 8 oz/week     3 Cans of beer per week    Drug use: No    Sexual activity: Not Asked     Other Topics Concern    None     Social History Narrative    None       Subjective    Subjective Data: pt  has cough but not complaining of shortness of breath at this time, will continue to monitor     Objective    Physical Exam:   Assessment Type: Assess only  General Appearance: Awake, Alert  Respiratory Pattern: Normal  Chest Assessment: Chest expansion symmetrical  Bilateral Breath Sounds: Diminished, Clear  O2 Device: room air     Vitals:  Blood pressure 141/81, pulse 103, temperature (!) 97 4 °F (36 3 °C), temperature source Oral, resp  rate 18, height 5' 8 5" (1 74 m), weight 115 kg (253 lb), SpO2 96 %  Imaging and other studies: I have personally reviewed pertinent reports        O2 Device: room air      Plan    Respiratory Plan: Home Bronchodilator Patient pathway        Resp Comments: pt  96% on RA, takes PRN MDI at home

## 2018-02-20 ENCOUNTER — APPOINTMENT (OUTPATIENT)
Dept: NON INVASIVE DIAGNOSTICS | Facility: HOSPITAL | Age: 58
End: 2018-02-20
Payer: COMMERCIAL

## 2018-02-20 VITALS
RESPIRATION RATE: 19 BRPM | BODY MASS INDEX: 37.47 KG/M2 | HEIGHT: 69 IN | TEMPERATURE: 97.7 F | DIASTOLIC BLOOD PRESSURE: 92 MMHG | OXYGEN SATURATION: 95 % | SYSTOLIC BLOOD PRESSURE: 152 MMHG | WEIGHT: 253 LBS | HEART RATE: 65 BPM

## 2018-02-20 PROBLEM — R07.89 CHEST TIGHTNESS: Status: RESOLVED | Noted: 2018-02-19 | Resolved: 2018-02-20

## 2018-02-20 LAB
ATRIAL RATE: 65 BPM
P AXIS: 48 DEGREES
PR INTERVAL: 168 MS
QRS AXIS: 69 DEGREES
QRSD INTERVAL: 86 MS
QT INTERVAL: 402 MS
QTC INTERVAL: 409 MS
T WAVE AXIS: 50 DEGREES
TROPONIN I SERPL-MCNC: <0.02 NG/ML
VENTRICULAR RATE: 62 BPM

## 2018-02-20 PROCEDURE — 93306 TTE W/DOPPLER COMPLETE: CPT | Performed by: INTERNAL MEDICINE

## 2018-02-20 PROCEDURE — 99244 OFF/OP CNSLTJ NEW/EST MOD 40: CPT | Performed by: INTERNAL MEDICINE

## 2018-02-20 PROCEDURE — 84484 ASSAY OF TROPONIN QUANT: CPT | Performed by: INTERNAL MEDICINE

## 2018-02-20 PROCEDURE — 93306 TTE W/DOPPLER COMPLETE: CPT

## 2018-02-20 PROCEDURE — 94660 CPAP INITIATION&MGMT: CPT

## 2018-02-20 PROCEDURE — 99217 PR OBSERVATION CARE DISCHARGE MANAGEMENT: CPT | Performed by: INTERNAL MEDICINE

## 2018-02-20 RX ORDER — FAMOTIDINE 20 MG/1
20 TABLET, FILM COATED ORAL
Qty: 30 TABLET | Refills: 0 | Status: SHIPPED | OUTPATIENT
Start: 2018-02-20

## 2018-02-20 RX ORDER — BENZONATATE 100 MG/1
100 CAPSULE ORAL 3 TIMES DAILY PRN
Qty: 20 CAPSULE | Refills: 0 | Status: SHIPPED | OUTPATIENT
Start: 2018-02-20

## 2018-02-20 RX ORDER — FAMOTIDINE 20 MG/1
20 TABLET, FILM COATED ORAL
Status: DISCONTINUED | OUTPATIENT
Start: 2018-02-20 | End: 2018-02-20 | Stop reason: HOSPADM

## 2018-02-20 RX ORDER — BUDESONIDE AND FORMOTEROL FUMARATE DIHYDRATE 160; 4.5 UG/1; UG/1
2 AEROSOL RESPIRATORY (INHALATION)
Qty: 1 INHALER | Refills: 0 | Status: SHIPPED | OUTPATIENT
Start: 2018-02-20

## 2018-02-20 RX ORDER — BUDESONIDE AND FORMOTEROL FUMARATE DIHYDRATE 160; 4.5 UG/1; UG/1
2 AEROSOL RESPIRATORY (INHALATION)
Status: DISCONTINUED | OUTPATIENT
Start: 2018-02-20 | End: 2018-02-20 | Stop reason: HOSPADM

## 2018-02-20 RX ORDER — LORATADINE 10 MG/1
10 TABLET ORAL DAILY
Status: DISCONTINUED | OUTPATIENT
Start: 2018-02-20 | End: 2018-02-20 | Stop reason: HOSPADM

## 2018-02-20 RX ORDER — FLUTICASONE PROPIONATE 50 MCG
2 SPRAY, SUSPENSION (ML) NASAL DAILY
Status: DISCONTINUED | OUTPATIENT
Start: 2018-02-21 | End: 2018-02-20 | Stop reason: HOSPADM

## 2018-02-20 RX ORDER — LORATADINE 10 MG/1
10 TABLET ORAL DAILY
Qty: 30 TABLET | Refills: 0 | Status: SHIPPED | OUTPATIENT
Start: 2018-02-21

## 2018-02-20 RX ORDER — FLUTICASONE PROPIONATE 50 MCG
2 SPRAY, SUSPENSION (ML) NASAL DAILY
Qty: 16 G | Refills: 0 | Status: SHIPPED | OUTPATIENT
Start: 2018-02-21

## 2018-02-20 RX ADMIN — GUAIFENESIN 600 MG: 600 TABLET, EXTENDED RELEASE ORAL at 08:42

## 2018-02-20 RX ADMIN — BUDESONIDE AND FORMOTEROL FUMARATE DIHYDRATE 2 PUFF: 160; 4.5 AEROSOL RESPIRATORY (INHALATION) at 11:54

## 2018-02-20 RX ADMIN — FLUTICASONE PROPIONATE 1 SPRAY: 50 SPRAY, METERED NASAL at 08:42

## 2018-02-20 RX ADMIN — HYDROCHLOROTHIAZIDE 12.5 MG: 12.5 TABLET ORAL at 08:42

## 2018-02-20 RX ADMIN — AZITHROMYCIN 250 MG: 250 TABLET, FILM COATED ORAL at 08:42

## 2018-02-20 RX ADMIN — LORATADINE 10 MG: 10 TABLET ORAL at 11:54

## 2018-02-20 NOTE — CONSULTS
Consultation - Pulmonary Medicine   Cordell Jenkins 56 y o  male MRN: [id number]  Unit/Bed#: -01 Encounter: [id number]      Assessment/Plan:    Subacute Cough, suspect multifactorial due to below   Management as below  Chronic Sinusitis/Post-Nasal Drip   Continue Flonase, but increase to 2 sprays each nostril daily for now  Add Claritin daily  GERD with questionable esophageal stricture - no notable symptoms per patient   Add Pepcid at HS  Recommend GI follow-up as outpatient  S/P Bronchitis - viral versus bacterial - with possible post-infectious bronchiolitis   Not wheezing at this time, but reports wheezing and has taken steroids with improvement  Add Symbicort and evaluate for benefit at outpatient follow-up  Can continue PRN Albuterol MDI as needed  S/P 5-6 courses of antibiotics  No further need for antibiotics at this time  LISA   Continue CPAP at HS  Echocardiogram pending per IM  Active Tobacco Abuse with Cigars   Advised the patient that he should have complete cessation  He verbalized understanding  Outpatient follow-up and PFTs per D/C instructions  Okay for D/C home today from pulmonary standpoint with follow-up as listed  D/W IM  History of Present Illness   Physician Requesting Consult: Malu Lujan MD  Reason for Consult / Principal Problem: Bronchospasm, Bronchitis  Hx and PE limited by: None  Chief Complaint: "I just keep coughing "  HPI: Cordell Jenkins is a 56 y o   male who presented to [address] [place] [address] with complaints of intractable cough and SOB when lying flat  [facility] reports that he started with a cough productive of green/yellow mucous around [place]  He has had five to six courses of antibiotics since that time  At times, he is improved, but overall, the cough and sputum production persists  He denies hemoptysis or chest pain/tightness  He does have wheezing, which is worse at night   He denies acid reflux symptoms or regurgitation  He denies LE swelling or pain  He has a chronic dry cough for about 10 years per his wife  He also has chronic nasal congestion and post-nasal drip after multiple broken noses  He occasionally uses Flonase  He denies any known allergies  He was diagnosed with LISA ~ 2 years ago and saw an ENT for options at that time  The only abnormality he recalls them finding aside from his nasal concerns as above was a slightly narrow esophagus, which he declined dilatation  He is very active and runs/walks on the treadmill daily  He denies SOB any other time than when lying flat  He has not gained or lost weight aside from slight fluctuations  He denies any nausea, vomiting, or diarrhea  He has never been diagnosed with any chronic lung disease, nor used inhaled therapies outside of an albuterol MDI he was given in the last two months  This inhaler provides him little benefit  He is on no other new medications aside from the antibiotics as above and a course of steroids  Inpatient consult to Pulmonology  Consult performed by: Edmar Vera ordered by: Jude Skinner        Review of Systems   All other systems reviewed and are negative  A full 12-point review of systems was completed and is negative except for those outlined in the HPI  Historical Information   Past Medical History:   Diagnosis Date    Depression     GERD (gastroesophageal reflux disease)     History of kidney stones     kidney stones    LISA on CPAP      Past Surgical History:   Procedure Laterality Date    APPENDECTOMY      HERNIA REPAIR       Social History   History   Alcohol Use    1 8 oz/week    3 Cans of beer per week     History   Drug Use No     History   Smoking Status    Current Every Day Smoker    Packs/day: 0 00    Types: Cigars   Smokeless Tobacco    Never Used     Comment: 1-2 cigars daily over the past 1 year     Occupational History: Works as a   Lives with his family   Has four cats, but has had cats and dogs all his life  He has no exotic/farm animal or bird exposure  He denies any mold or asbestos exposure, aside from a renovation of his workplace ~ 4-5 years ago  Family History:   Family History   Problem Relation Age of Onset    Heart attack Maternal Grandmother     Lung disease Neg Hx        Meds/Allergies   all current active meds have been reviewed, pertinent pulmonary meds have been reviewed, current meds:   Current Facility-Administered Medications   Medication Dose Route Frequency    acetaminophen (TYLENOL) tablet 650 mg  650 mg Oral Q6H PRN    azithromycin (ZITHROMAX) tablet 250 mg  250 mg Oral Q24H    benzonatate (TESSALON PERLES) capsule 100 mg  100 mg Oral TID PRN    Brexpiprazole TABS 1 mg  1 mg Oral Daily    fluticasone (FLONASE) 50 mcg/act nasal spray 1 spray  1 spray Each Nare Daily    guaiFENesin (MUCINEX) 12 hr tablet 600 mg  600 mg Oral Q12H SUKHWINDER    hydrochlorothiazide (HYDRODIURIL) tablet 12 5 mg  12 5 mg Oral Daily    levalbuterol (XOPENEX) inhalation solution 0 63 mg  0 63 mg Nebulization Q4H PRN    and PTA meds:   Prior to Admission Medications   Prescriptions Last Dose Informant Patient Reported? Taking? Brexpiprazole (REXULTI) 1 MG TABS   Yes Yes   Sig: Take 1 mg by mouth daily   hydrochlorothiazide (HYDRODIURIL) 12 5 mg tablet   Yes Yes   Sig: Take 12 5 mg by mouth daily      Facility-Administered Medications: None     No Known Allergies    Objective   Vitals: Blood pressure 152/92, pulse 65, temperature 97 7 °F (36 5 °C), temperature source Oral, resp  rate 19, height 5' 8 5" (1 74 m), weight 115 kg (253 lb), SpO2 95 %  RA,Body mass index is 37 91 kg/m²      Intake/Output Summary (Last 24 hours) at 02/20/18 1113  Last data filed at 02/20/18 0945   Gross per 24 hour   Intake              480 ml   Output                0 ml   Net              480 ml     Invasive Devices     Peripheral Intravenous Line            Peripheral IV 02/19/18 Right Antecubital 1 day              Physical Exam   Constitutional: He is oriented to person, place, and time  He appears well-developed and well-nourished  He is cooperative  Non-toxic appearance  No distress  HENT:   Head: Normocephalic and atraumatic  Mouth/Throat: No oropharyngeal exudate  Eyes: EOM are normal  No scleral icterus  Neck: Neck supple  No JVD present  No tracheal deviation present  Cardiovascular: Normal rate, regular rhythm, S1 normal and S2 normal   Exam reveals no gallop and no friction rub  No murmur heard  Pulmonary/Chest: Effort normal and breath sounds normal  No accessory muscle usage or stridor  No tachypnea  No respiratory distress  He has no decreased breath sounds  He has no wheezes  He has no rhonchi  He has no rales  He exhibits no tenderness  No forced expiratory wheeze  Abdominal: Soft  Bowel sounds are normal  He exhibits no distension  There is no tenderness  There is no rebound and no guarding  Musculoskeletal: He exhibits no edema or tenderness  Lymphadenopathy:     He has no cervical adenopathy  Neurological: He is alert and oriented to person, place, and time  He has normal strength  GCS eye subscore is 4  GCS verbal subscore is 5  GCS motor subscore is 6  Skin: Skin is warm and dry  No abrasion, no ecchymosis, no lesion and no rash noted  He is not diaphoretic  No cyanosis or erythema  Nails show no clubbing  Psychiatric: He has a normal mood and affect   His speech is normal and behavior is normal      Lab Results:   Blood cultures pending    Sputum culture not sent    Influenza/RSV PCR negative    BNP 18    VBG 7 36/44/42/25    D-Dimer < 270    Troponin:   Lab Results   Component Value Date    TROPONINI <0 02 02/20/2018     Lab Results   Component Value Date     02/19/2018    K 4 2 02/19/2018     02/19/2018    CO2 28 02/19/2018    ANIONGAP 9 02/19/2018    BUN 18 02/19/2018    CREATININE 1 02 02/19/2018    GLUCOSE 103 02/19/2018    CALCIUM 8 6 02/19/2018 AST 22 02/19/2018    ALT 39 02/19/2018    ALKPHOS 72 02/19/2018    PROT 6 8 02/19/2018    BILITOT 0 60 02/19/2018    EGFR 82 02/19/2018     Lab Results   Component Value Date    WBC 5 67 02/19/2018    HGB 15 4 02/19/2018    HCT 44 8 02/19/2018    MCV 85 02/19/2018     02/19/2018     Imaging Studies: I have personally reviewed pertinent reports   , I have personally reviewed pertinent films in PACS and CXR shows no acute pulmonary infiltrate, effusion, or mass; CTA Chest shows no PE or acute abnormality    EKG, Pathology, and Other Studies: I have personally reviewed pertinent reports  and EKG shows NSR; Echocardiogram pending    Pulmonary Results (PFTs, PSG): None    VTE Prophylaxis: Sequential compression device (Venodyne)  and Enoxaparin (Lovenox)    Code Status: Level 1 - Full Code    None    Portions of the record may have been created with voice recognition software  Occasional wrong word or "sound a like" substitutions may have occurred due to the inherent limitations of voice recognition software  Read the chart carefully and recognize, using context, where substitutions have occurred

## 2018-02-20 NOTE — PLAN OF CARE
Problem: PAIN - ADULT  Goal: Verbalizes/displays adequate comfort level or baseline comfort level  Interventions:  - Encourage patient to monitor pain and request assistance  - Assess pain using appropriate pain scale  - Administer analgesics based on type and severity of pain and evaluate response  - Implement non-pharmacological measures as appropriate and evaluate response  - Consider cultural and social influences on pain and pain management  - Notify physician/advanced practitioner if interventions unsuccessful or patient reports new pain   Outcome: Progressing      Problem: INFECTION - ADULT  Goal: Absence or prevention of progression during hospitalization  INTERVENTIONS:  - Assess and monitor for signs and symptoms of infection  - Monitor lab/diagnostic results  - Monitor all insertion sites, i e  indwelling lines, tubes, and drains  - Monitor endotracheal (as able) and nasal secretions for changes in amount and color  - [place] appropriate cooling/warming therapies per order  - Administer medications as ordered  - Instruct and encourage patient and family to use good hand hygiene technique  - Identify and instruct in appropriate isolation precautions for identified infection/condition   Outcome: Progressing    Goal: Absence of fever/infection during neutropenic period  INTERVENTIONS:  - Monitor WBC  - Implement neutropenic guidelines   Outcome: Completed Date Met: 02/20/18      Problem: SAFETY ADULT  Goal: Patient will remain free of falls  INTERVENTIONS:  - Assess patient frequently for physical needs  -  Identify cognitive and physical deficits and behaviors that affect risk of falls    -  [place] fall precautions as indicated by assessment   - Educate patient/family on patient safety including physical limitations  - Instruct patient to call for assistance with activity based on assessment  - Modify environment to reduce risk of injury  - Consider OT/PT consult to assist with strengthening/mobility Outcome: Completed Date Met: 02/20/18    Goal: Maintain or return to baseline ADL function  INTERVENTIONS:  -  Assess patient's ability to carry out ADLs; assess patient's baseline for ADL function and identify physical deficits which impact ability to perform ADLs (bathing, care of mouth/teeth, toileting, grooming, dressing, etc )  - Assess/evaluate cause of self-care deficits   - Assess range of motion  - Assess patient's mobility; develop plan if impaired  - Assess patient's need for assistive devices and provide as appropriate  - Encourage maximum independence but intervene and supervise when necessary  ¯ Involve family in performance of ADLs  ¯ Assess for home care needs following discharge   ¯ Request OT consult to assist with ADL evaluation and planning for discharge  ¯ Provide patient education as appropriate   Outcome: Completed Date Met: 02/20/18    Goal: Maintain or return mobility status to optimal level  INTERVENTIONS:  - Assess patient's baseline mobility status (ambulation, transfers, stairs, etc )    - Identify cognitive and physical deficits and behaviors that affect mobility  - Identify mobility aids required to assist with transfers and/or ambulation (gait belt, sit-to-stand, lift, walker, cane, etc )  - [place] fall precautions as indicated by assessment  - Record patient progress and toleration of activity level on Mobility SBAR; progress patient to next Phase/Stage  - Instruct patient to call for assistance with activity based on assessment  - Request Rehabilitation consult to assist with strengthening/weightbearing, etc    Outcome: Completed Date Met: 02/20/18      Problem: DISCHARGE PLANNING  Goal: Discharge to home or other facility with appropriate resources  INTERVENTIONS:  - Identify barriers to discharge w/patient and caregiver  - Arrange for needed discharge resources and transportation as appropriate  - Identify discharge learning needs (meds, wound care, etc )  - Arrange for interpretive services to assist at discharge as needed  - Refer to Case Management Department for coordinating discharge planning if the patient needs post-hospital services based on physician/advanced practitioner order or complex needs related to functional status, cognitive ability, or social support system   Outcome: Progressing

## 2018-02-20 NOTE — CASE MANAGEMENT
Initial Clinical Review    Admission: Date/Time/Statement: 2/19/2018  0901 OBSERVATION    Orders Placed This Encounter   Procedures    Place in Observation (expected length of stay for this patient is less than two midnights)     Telemetry     Standing Status:   Standing     Number of Occurrences:   1     Order Specific Question:   Admitting Physician     Answer:   Jamie Cheung     Order Specific Question:   Level of Care     Answer:   Med Surg [16]     Order Specific Question:   Bed request comments     Answer:   telemetry         ED: Date/Time/Mode of Arrival:   ED Arrival Information     Expected Arrival Acuity Means of Arrival Escorted By Service Admission Type    - 2/19/2018 05:58 Urgent Walk-In Self General Medicine Urgent    Arrival Complaint    Difficulty breathing, chest tightness          Chief Complaint:   Chief Complaint   Patient presents with    Shortness of Breath     Pt presents to ED for evaluation an dtreatment of shortness of breath and chest tightness for past 2 months  Has been treated with 2 different antiobiotics with no relief of symptoms  Symptoms worse at night       History of Illness: 56 y o  male with a history of LISA on CPAP, kidney stones and depression who presents with cough and chest tightness  Patient reports cold-like symptoms over the past 2 months, including SOB, chest tightness, wheezing and cough  He notes that he has been treated with about 4-5 antibiotics, a course of p o  steroids and has been using an albuterol inhaler over the past 2 months without improvement in his symptoms  He reports that his cough has been productive of greenish, yellowish, white sputum  He admits to post nasal drip and has been using a nasal steroid  Patient notes that his cough worsens at night and that he has had difficulty sleeping due to increased SOB and coughing at night  Patient has a history of LISA and reports compliance with CPAP    He denies chest pain but reports tightness and has noticed that he has been wheezing intermittently  He denies dyspnea on exertion and reports that he is still able to do his usual cardiac exercises on the treadmill  He denies fevers/ chills  Patient reports that he was unable to sleep last night due to his SOB which prompted him to come to the hospital      On arrival to the hospital, blood work was obtained, D-dimer, troponin, lactic acid and BNP were within normal limits  Chest x-ray showed no acute findings and CTA chest was negative for acute thoracic process and negative for PE  EKG was normal sinus rhythm  Per ED reports, patient was noted to have wheezing on exam  He received Solu-Medrol 125 mg IV and albuterol nebulizer treatments  He also received 1 dose of azithromycin and ceftriaxone  He has noticed improvement in his symptoms since presentation  Influenza and RSV are negative      He notes that he smokes 1-2 cigars daily over the past 1 year and had second hand smoke as a kid, his father reportedly smoked  He reports that he had a normal exercise stress test about 5-10 years ago  ED Vital Signs:   ED Triage Vitals   Temperature Pulse Respirations Blood Pressure SpO2   02/19/18 0720 02/19/18 0618 02/19/18 0618 02/19/18 0618 02/19/18 0618   98 8 °F (37 1 °C) 66 16 125/68 97 %      Temp Source Heart Rate Source Patient Position - Orthostatic VS BP Location FiO2 (%)   02/19/18 0720 02/19/18 0618 02/19/18 0618 02/19/18 0618 02/19/18 2250   Oral Monitor Lying Right arm 21      Pain Score       02/19/18 0618       2        Wt Readings from Last 1 Encounters:   02/19/18 115 kg (253 lb)       Vital Signs (abnormal): Low temperature 97 4  Maximum pulse 100  Exam - respiratory distress  Wheezes  Abnormal Labs/Diagnostic Test Results:   Serial troponin negative  cta chest - No pulmonary embolus  No CT evidence of acute thoracic process  Minimal aortic and coronary artery calcification    ED Treatment: blood cultures     Medication Administration from 02/19/2018 0558 to 02/19/2018 1731       Date/Time Order Dose Route Action Action by Comments     02/19/2018 0718 albuterol inhalation solution 5 mg 5 mg Nebulization Given Stevenson Banks RN      02/19/2018 4687 aspirin chewable tablet 162 mg 162 mg Oral Given Stevenson Banks RN      02/19/2018 0024 methylPREDNISolone sodium succinate (Solu-MEDROL) injection 125 mg 125 mg Intravenous Given Tina Garcia RN      02/19/2018 0930 ceftriaxone (ROCEPHIN) 1 g/50 mL in dextrose IVPB 0 mg Intravenous Stopped Pierre Padilla RN      02/19/2018 1150 ceftriaxone (ROCEPHIN) 1 g/50 mL in dextrose IVPB 1,000 mg Intravenous [address] Tina Garcia RN      02/19/2018 0917 azithromycin (ZITHROMAX) tablet 500 mg 500 mg Oral Given Tina Garcia RN      02/19/2018 0930 sodium chloride 0 9 % bolus 1,000 mL 0 mL Intravenous Stopped Pierre Padilla RN      02/19/2018 0807 sodium chloride 0 9 % bolus 1,000 mL 1,000 mL Intravenous [address] Tina Garcia RN      02/19/2018 0954 albuterol inhalation solution 5 mg 5 mg Nebulization Given Pierre Padilla RN      02/19/2018 0827 iohexol (OMNIPAQUE) 350 MG/ML injection (MULTI-DOSE) 100 mL 85 mL Intravenous Given Eluterio Jarred           Past Medical/Surgical History: Active Ambulatory Problems     Diagnosis Date Noted    No Active Ambulatory Problems     Resolved Ambulatory Problems     Diagnosis Date Noted    No Resolved Ambulatory Problems     Past Medical History:   Diagnosis Date    Depression     GERD (gastroesophageal reflux disease)     History of kidney stones     LISA on CPAP        Admitting Diagnosis: Bronchospasm [J98 01]  Chest tightness [R07 89]  Chest pain [R07 9]  Bronchitis [J40]  Difficulty breathing [R06 89]    Age/Sex: 56 y o  male    Assessment/Plan:   Cough   Assessment & Plan     · Cough and SOB x 2 months despite multiple antibiotics, albuterol inhaler and p o  steroids as an outpatient  ? Possible COPD     § Patient reports smoking 1-2 cigars daily over the past 1 year and also notes second hand smoke exposure  · Patient received Solu-Medrol 125 mg in the ED as well as nebulizer treatments and has since noticed some improvement in his symptoms  Patient also received 1 dose of p o  azithromycin and IV ceftriaxone  · Chest x-ray: No acute cardiopulmonary disease  · CTA chest: No pulmonary embolus  No CT evidence of acute thoracic process  Minimal aortic and coronary artery calcification  · Influenza and RSV by PCR negative  · No leukocytosis, patient afebrile  · No wheezing noted at this time  ? Hold off on further steroids  ? Respiratory protocol  Continue albuterol nebulizer treatments prn    ? Obtain sputum culture  § Continue p o  azithromycin  ? Pulmonology consult, appreciate input  § Patient will likely need outpatient PFTs  ? Pulse ox stable on RA  ? Mucinex b i d  and Tessalon Perles prn               Chest tightness   Assessment & Plan     · Patient denies chest pain, notes tightness  Possibly due to suspected COPD  ? Received 1 dose of Solu-Medrol in ED  No wheezing noted on exam  No further steroids at this time  ? Respiratory protocol     · Obtain troponin x 3   · Obtain echocardiogram            LISA on CPAP   Assessment & Plan     · Continue CPAP HS            Depression   Assessment & Plan     · Continue Rexulti           GERD (gastroesophageal reflux disease)   Assessment & Plan     · Mylanta prn            History of kidney stones   Assessment & Plan     · Patient reports taking HCTZ daily for his h/o kidney stones         Admission Orders:  TELE  2/19/2018  0901 OBSERVATION  Scheduled Meds:   Current Facility-Administered Medications:  acetaminophen 650 mg Oral Q6H PRN Mariano Cardenas PA-C   azithromycin 250 mg Oral Q24H Delma Neumann PA-C   benzonatate 100 mg Oral TID PRN Delma Neumann PA-C   Brexpiprazole 1 mg Oral Daily Chastity Kenney PA-C   fluticasone 1 spray Each Nare Daily Chastity Kenney PA-C   guaiFENesin 600 mg Oral Q12H [id number]   hydrochlorothiazide 12 5 mg Oral Daily Chastity Kenney PA-C   levalbuterol 0 63 mg Nebulization Q4H PRN Lina Layton MD     Continuous Infusions:    PRN Meds:   Acetaminophen - used x 1      benzonatate    levalbuterol    OTHER ORDERS:  Consult pulmonary  Echo  Respiratory protocol - on room air  1  Per pulmonary - Subacute on likely chronic cough - multifactorial in etiology to include UACS, likely GERD component, active tobacco abuse  2  Upper airway cough syndrome  3  GERD  4  LISA on CPAP  5  Tobacco abuse     RECS as listed without changes or adjustements  · Agree with H2 blockade, nonpharmacologic GERD therapies - raise HOB 2", dietary changes  · Trial Symbicort, flonase, antihistamine therapy  · Stressed need for complete tobacco cessation as this will increase cough reflex and promote further coughing   · Pulmonary follow up with spirometry  Stable for d/c from pulmonary standpoint - d/w Dr Ralph Casillas        Thank you,  [facility] in the [facility] by Ari Cabrera for 2017  Network Utilization Review Department  Phone: [phone]; Fax [phone]  ATTENTION: The Network Utilization Review Department is now centralized for our 7 Facilities  Make a note that we have a new phone and fax numbers for our Department  Please call with any questions or concerns to [phone] and carefully follow the prompts so that you are directed to the right person  All voicemails are confidential  Fax any determinations, approvals, denials, and requests for initial or continue stay review clinical to [phone]  Due to HIGH CALL volume, it would be easier if you could please send faxed requests to expedite your requests and in part, help us provide discharge notifications faster

## 2018-02-20 NOTE — DISCHARGE SUMMARY
Discharge Summary - [facility] - Internal Medicine      Patient Information: Juve Cheung 56 y o  male MRN: [id number]  Unit/Bed#: -01 Encounter: [id number]    Discharging Physician / Practitioner: Kayleigh Dowell MD  PCP: Sanjana Wilder MD  Admission Date: 2/19/2018  Discharge Date: 02/20/18      Reason for Admission:  Shortness of breath and cough      Discharge Diagnoses:     Principal Problem:    Subacute cough - Bronchitis    Chronic Problems:    GERD     Depression    LISA on CPAP    Tobacco (cigar) abuse      Consultations During Hospital Stay:  · Pulmonology      Hospital Course:     Juve Cheung is a 56 y o  male patient who originally presented to the hospital on 2/19/2018 due to complaints of shortness of breath with an associated cough that been ongoing for the last few months that acutely worsened over the last few days prior to arrival in the ER  He apparently also had some mild complaints of postnasal drip  He acknowledges daily cigar smoking  He has a history of obstructive sleep apnea for which he is compliant with his CPAP machine at home  He denied any prior history of asthma/COPD  A CXR in the ER was negative for acute cardiopulmonary disease  A CT angiogram of the chest was negative for pulmonary embolism or other acute thoracic process  He was given as needed breathing treatments which did help alleviate his symptoms  He was also seen by pulmonology who did recommend follow-up as an outpatient in their office for possible pulmonary function studies in the future  Through the course of his brief observation  , his symptoms did improve  His subacute coughing was attributed to possible upper airway cough syndrome secondary to a postnasal drip coupled with his chronic history of GERD and obstructive sleep apnea  He was also heavily counseled on tobacco cessation of his daily cigar use    He was continued on his corticosteroid nasal spray and was given a prescription for an inhaled corticosteroid  Claritin was also added to his medication regimen  The patient will follow up with his PCP in approximately one weeks time and pulmonology as instructed  He understood discharge instructions and agreed to follow  Condition at Discharge: good       Discharge Day Visit / Exam:     Vitals: Blood Pressure: 152/92 (02/20/18 0711)  Pulse: 65 (02/20/18 0711)  Temperature: 97 7 °F (36 5 °C) (02/20/18 0711)  Temp Source: Oral (02/20/18 0711)  Respirations: 19 (02/20/18 0711)  Height: 5' 8 5" (174 cm) (02/19/18 0618)  Weight - Scale: 115 kg (253 lb) (02/19/18 0618)  SpO2: 95 % (02/20/18 0711)      Physical exam - I had a face-to-face encounter with the patient on day of discharge  Discussion with Patient and/or Family:  The patient has been advised to return to the ER immediately if any symptoms recur or worsen  Discharge instructions/Information to Patient and/or Family:   See after visit summary for information provided to patient and/or family  Provisions for Follow-Up Care:  See after visit summary for information related to follow-up care and any pertinent home health orders  Disposition:   Home        Discharge Statement:  I spent 37 minutes discharging the patient  This time was spent on the day of discharge  I had direct contact with the patient on the day of discharge  Greater than 50% of the total time was spent examining patient, answering all patient questions, arranging and discussing plan of care with patient as well as directly providing post-discharge instructions  Additional time then spent on discharge activities  Discharge Medications:  See after visit summary for reconciled discharge medications provided to patient and family        ** Please Note: This note has been constructed using a voice recognition system **

## 2018-02-24 LAB
BACTERIA BLD CULT: NORMAL
BACTERIA BLD CULT: NORMAL

## 2018-03-27 ENCOUNTER — OFFICE VISIT (OUTPATIENT)
Dept: SURGERY | Facility: CLINIC | Age: 58
End: 2018-03-27

## 2018-03-27 VITALS — BODY MASS INDEX: 20.3 KG/M2 | HEIGHT: 71 IN | WEIGHT: 145 LBS

## 2018-03-27 DIAGNOSIS — L72.3 SEBACEOUS CYST: Primary | ICD-10-CM

## 2018-03-27 PROCEDURE — 11422 EXC H-F-NK-SP B9+MARG 1.1-2: CPT | Performed by: SURGERY

## 2018-03-27 RX ORDER — LEVOTHYROXINE SODIUM 0.1 MG/1
100 TABLET ORAL DAILY
COMMUNITY

## 2018-03-27 NOTE — PROGRESS NOTES
Subjective   Reynaldo Payne is a 57 y.o. male.     History of Present Illness He has had a cyst on his neck for 30 years but it got bigger last week and painful. I/D done last week. He is on antibiotics.     The following portions of the patient's history were reviewed and updated as appropriate: current medications, past family history, past medical history, past social history, past surgical history and problem list.    Review of Systems skin cyst    Objective   Physical Exam excised 2 cm cyst on back of neck    Assessment/Plan   Reynaldo was seen today for cyst.    Diagnoses and all orders for this visit:    Sebaceous cyst    return 1 wk

## 2018-04-03 ENCOUNTER — OFFICE VISIT (OUTPATIENT)
Dept: SURGERY | Facility: CLINIC | Age: 58
End: 2018-04-03

## 2018-04-03 VITALS — HEIGHT: 71 IN | WEIGHT: 145 LBS | BODY MASS INDEX: 20.3 KG/M2

## 2018-04-03 DIAGNOSIS — L72.3 SEBACEOUS CYST: Primary | ICD-10-CM

## 2018-04-03 PROCEDURE — 99024 POSTOP FOLLOW-UP VISIT: CPT | Performed by: SURGERY

## 2018-04-03 RX ORDER — ASPIRIN 81 MG/1
81 TABLET ORAL DAILY
COMMUNITY

## 2018-04-03 NOTE — PROGRESS NOTES
Subjective   Reynaldo Payne is a 57 y.o. male.     History of Present Illness His wound is fine.     The following portions of the patient's history were reviewed and updated as appropriate: current medications, past family history, past medical history, past social history, past surgical history and problem list.    Review of Systems    Objective   Physical Exam sutures removed    Assessment/Plan   Reynaldo was seen today for follow-up.    Diagnoses and all orders for this visit:    Sebaceous cyst      Return prn

## 2018-05-03 ENCOUNTER — EVALUATION (OUTPATIENT)
Dept: PHYSICAL THERAPY | Facility: REHABILITATION | Age: 58
End: 2018-05-03
Payer: COMMERCIAL

## 2018-05-03 DIAGNOSIS — Z98.890 S/P LATERAL MENISCUS REPAIR OF RIGHT KNEE: Primary | ICD-10-CM

## 2018-05-03 PROCEDURE — G8991 OTHER PT/OT GOAL STATUS: HCPCS | Performed by: PHYSICAL THERAPIST

## 2018-05-03 PROCEDURE — G8990 OTHER PT/OT CURRENT STATUS: HCPCS | Performed by: PHYSICAL THERAPIST

## 2018-05-03 PROCEDURE — 97110 THERAPEUTIC EXERCISES: CPT | Performed by: PHYSICAL THERAPIST

## 2018-05-03 PROCEDURE — 97140 MANUAL THERAPY 1/> REGIONS: CPT | Performed by: PHYSICAL THERAPIST

## 2018-05-03 PROCEDURE — 97161 PT EVAL LOW COMPLEX 20 MIN: CPT | Performed by: PHYSICAL THERAPIST

## 2018-05-03 NOTE — LETTER
May 10, 2018    MD Gema Sandoval [address] [facility]    Patient: Jorge L Khan   YOB: 1960   Date of Visit: 5/3/2018     Encounter Diagnosis     ICD-10-CM    1  S/P lateral meniscus repair of right knee Z98 890 PT plan of care cert/re-cert       Dear Dr Patrick Andrade:    Please review the attached Plan of Care from [facility]  recent visit  Please verify that you agree therapy should continue by signing the attached document and sending it back to our office  If you have any questions or concerns, please don't hesitate to call  Sincerely,    Ed Greer, PT      Referring Provider:      I certify that I have read the below Plan of Care and certify the need for these services furnished under this plan of treatment while under my care  MD Gema Sandoval [address]: [phone]          PT Evaluation     Today's date: 5/3/2018  Patient name: Jorge L Khan  : 1960  MRN: [id number]  Referring provider: Luiza Adamson MD  Dx:   Encounter Diagnosis     ICD-10-CM    1  S/P lateral meniscus repair of right knee Z98 890        Start Time: 1115  Stop Time: 1215  Total time in clinic (min): 60 minutes    Assessment  Impairments: abnormal gait, abnormal muscle firing, abnormal muscle tone, abnormal or restricted ROM, impaired balance, lacks appropriate home exercise program and pain with function    Assessment details: Jorge L Khan is a 56 y o  male who presents with complaints of S/P meniscus repair  No further referral appears necessary at this time based upon examination results  Patient presents bilateral axillary crutches with decreased quad tone  He will benefit from therapy to improve knee ROM and strength to return to prior level of function  Prognosis is good given HEP compliance and PT 2-3x/wk    Positive prognostic indicators include positive attitude toward recovery  Please contact me if you have any questions or recommendations  Thank you for the opportunity to share in  [facility]  Understanding of Dx/Px/POC: excellent  Plan  Patient would benefit from: skilled PT  Planned modality interventions: thermotherapy: hydrocollator packs  Planned therapy interventions: joint mobilization, manual therapy, neuromuscular re-education, strengthening, stretching, therapeutic activities, therapeutic exercise, therapeutic training, graded motor, graded activity, gait training, graded exercise, home exercise program, functional ROM exercises, flexibility, balance and balance/weight bearing training  Frequency: 2x week  Duration in weeks: 6  Treatment plan discussed with: patient        Subjective Evaluation    History of Present Illness  Date of surgery: 2018  Mechanism of injury: Patient was out for long run and his knee started clicking and had pain in right knee  Presents to therapy day after surgery  Reports prior left meniscus repair 4-5 years ago  Patient presents to therapy with bilateral axillary crutches  Patient reports soreness/achiness today in right knee  No numbness/tingling reported nor sharp pains  Has trouble sleeping  Patient would like to return running and have no limitations with stair navigation     Not a recurrent problem   Quality of life: good    Pain  Current pain ratin  At best pain ratin  At worst pain ratin  Quality: dull ache  Aggravating factors: standing, stair climbing, walking and running    Social Support  Steps to enter house: yes  Stairs in house: yes   1    Patient Goals  Patient goals for therapy: decreased pain, increased strength, increased motion, return to sport/leisure activities, return to work and independence with ADLs/IADLs  Patient goal: Patient would like to be able to return to running per job requirements, and stair navigation        Objective    GAIT: BUE axillary crutches  Squat assess: 30 degree RLE pain  ¼ squat assess: normal LLE  SLS: 10 sec  Each leg           MMT    Hip       L       R   Flex  5 4+   Extn  5 4+   Abd  5 4   Add  5 4   IR  5 5   ER  5 5                 MMT         AROM         PROM    Knee         L        R        L         R         L          R   Flex  5 4 115 80 120 86   Extn  5 4- WNL -2 WNL WNL                         MMT    Ankle       L        R   PF 5 4   DF  5 5   EHL 5 5         Palpation: TTP medial joint line  Straight leg raise:   L= -   R= -    Knee:  Slight limitation patellar mobility, good wound healing   Decreased quad tone RLE    Precautions: back pain, recent surgery    Daily Treatment Diary     Manual  5/3            Quad stretch, jose position 10x10"            Dressing change FB                                                       Exercise Diary  5/3            Quad set 2x10, 5"            Prone quad stretch 10x10"            Seated calf stretch 5x20"            Seated heel slide np            Calf raise 2x10            LAQ ROM 2x30            glute sets 2x10, 5"            Clam shell 2x10, 5"                                                                                              Modalities                                                         Short Term:  1  Pt will report decreased levels of pain by at least 2 subjective ratings in 4 weeks  2  Pt will demonstrate improved ROM by at least 10 degrees in 4 weeks  3  Pt will demonstrate improved strength by 1/2 grade  4  Pt will be able to ascend/descend steps in 4 weeks  Long Term:   1  Pt will be independent in their HEP in 6 weeks  2  Pt will be be pain free with IADL's  3   Pt will demonstrate improved FOTO, > 25    4  Pt will be able to return to jogging in 6 weeks

## 2018-05-03 NOTE — PROGRESS NOTES
PT Evaluation     Today's date: 5/3/2018  Patient name: Vika Mathews  : 1960  MRN: [id number]  Referring provider: Ny Lugo MD  Dx:   Encounter Diagnosis     ICD-10-CM    1  S/P lateral meniscus repair of right knee Z98 890        Start Time: 1115  Stop Time: 1215  Total time in clinic (min): 60 minutes    Assessment  Impairments: abnormal gait, abnormal muscle firing, abnormal muscle tone, abnormal or restricted ROM, impaired balance, lacks appropriate home exercise program and pain with function    Assessment details: Vika Mathews is a 56 y o  male who presents with complaints of S/P meniscus repair  No further referral appears necessary at this time based upon examination results  Patient presents bilateral axillary crutches with decreased quad tone  He will benefit from therapy to improve knee ROM and strength to return to prior level of function  Prognosis is good given HEP compliance and PT 2-3x/wk  Positive prognostic indicators include positive attitude toward recovery  Please contact me if you have any questions or recommendations  Thank you for the opportunity to share in  [facility]  Understanding of Dx/Px/POC: excellent  Plan  Patient would benefit from: skilled PT  Planned modality interventions: thermotherapy: hydrocollator packs  Planned therapy interventions: joint mobilization, manual therapy, neuromuscular re-education, strengthening, stretching, therapeutic activities, therapeutic exercise, therapeutic training, graded motor, graded activity, gait training, graded exercise, home exercise program, functional ROM exercises, flexibility, balance and balance/weight bearing training  Frequency: 2x week  Duration in weeks: 6  Treatment plan discussed with: patient        Subjective Evaluation    History of Present Illness  Date of surgery: 2018  Mechanism of injury: Patient was out for long run and his knee started clicking and had pain in right knee   Presents to therapy day after surgery  Reports prior left meniscus repair 4-5 years ago  Patient presents to therapy with bilateral axillary crutches  Patient reports soreness/achiness today in right knee  No numbness/tingling reported nor sharp pains  Has trouble sleeping  Patient would like to return running and have no limitations with stair navigation  Not a recurrent problem   Quality of life: good    Pain  Current pain ratin  At best pain ratin  At worst pain ratin  Quality: dull ache  Aggravating factors: standing, stair climbing, walking and running    Social Support  Steps to enter house: yes  Stairs in house: yes   1    Patient Goals  Patient goals for therapy: decreased pain, increased strength, increased motion, return to sport/leisure activities, return to work and independence with ADLs/IADLs  Patient goal: Patient would like to be able to return to running per job requirements, and stair navigation        Objective    GAIT: BUE axillary crutches  Squat assess: 30 degree RLE pain  ¼ squat assess: normal LLE  SLS: 10 sec  Each leg           MMT    Hip       L       R   Flex  5 4+   Extn  5 4+   Abd  5 4   Add  5 4   IR  5 5   ER  5 5                 MMT         AROM         PROM    Knee         L        R        L         R         L          R   Flex  5 4 115 80 120 86   Extn  5 4- WNL -2 WNL WNL                         MMT    Ankle       L        R   PF 5 4   DF   5 5   EHL 5 5         Palpation: TTP medial joint line  Straight leg raise:   L= -   R= -    Knee:  Slight limitation patellar mobility, good wound healing   Decreased quad tone RLE    Precautions: back pain, recent surgery    Daily Treatment Diary     Manual  5/3            Quad stretch, jose position 10x10"            Dressing change FB                                                       Exercise Diary  5/3            Quad set 2x10, 5"            Prone quad stretch 10x10"            Seated calf stretch 5x20"            Seated heel slide np            Calf raise 2x10            LAQ ROM 2x30            glute sets 2x10, 5"            Clam shell 2x10, 5"                                                                                              Modalities                                                         Short Term:  1  Pt will report decreased levels of pain by at least 2 subjective ratings in 4 weeks  2  Pt will demonstrate improved ROM by at least 10 degrees in 4 weeks  3  Pt will demonstrate improved strength by 1/2 grade  4  Pt will be able to ascend/descend steps in 4 weeks  Long Term:   1  Pt will be independent in their HEP in 6 weeks  2  Pt will be be pain free with IADL's  3   Pt will demonstrate improved FOTO, > 25    4  Pt will be able to return to jogging in 6 weeks

## 2018-05-07 ENCOUNTER — OFFICE VISIT (OUTPATIENT)
Dept: PHYSICAL THERAPY | Facility: REHABILITATION | Age: 58
End: 2018-05-07
Payer: COMMERCIAL

## 2018-05-07 DIAGNOSIS — Z98.890 S/P LATERAL MENISCUS REPAIR OF RIGHT KNEE: Primary | ICD-10-CM

## 2018-05-07 PROCEDURE — 97110 THERAPEUTIC EXERCISES: CPT | Performed by: PHYSICAL THERAPIST

## 2018-05-07 PROCEDURE — 97112 NEUROMUSCULAR REEDUCATION: CPT | Performed by: PHYSICAL THERAPIST

## 2018-05-07 NOTE — PROGRESS NOTES
Daily Note     Today's date: 2018  Patient name: Georg Bence  : 1960  MRN: [id number]  Referring provider: Makayla Washburn MD  Dx:   Encounter Diagnosis     ICD-10-CM    1  S/P lateral meniscus repair of right knee Z98 890        Start Time: 1100  Stop Time: 1150  Total time in clinic (min): 50 minutes    Subjective: Patient reports that he has been using one crutch at home  During well but is limited with standing for long durations, otherwise swelling occurs  Objective: See treatment diary below    Precautions: back pain, recent surgery     Daily Treatment Diary      Manual  5/3  5/7                   Quad stretch, jose position 10x10"  10x10"                   Dressing change FB                                                                                                   Exercise Diary  5/3  5/7                   Quad set 2x10, 5"  1x10, 5" (D/C HEP)                   HEP - Prone quad stretch, Seated calf stretch, seated heel slide, calf raise, LAQ AROM, quad set 10x10"                     LAQ ROM 2x30  3x10, 5" OTB                   glute sets 2x10, 5"  HEP                   Clam shell 2x10, 5"  1x10, 5"                    recumbent bike    10'                   SLR flexion   2x10, 3"                    seated heel slide   5'                    total gym lvl 4 (0-60 deg )   8'                                                                                      Modalities                                                                                                    Assessment: Tolerated treatment well  Patient demonstrates normal gait pattern at this time  Improved quad activation noted  Cueing needed to avoid excessive knee flexion at this time  Plan: Continue per plan of care

## 2018-05-08 RX ORDER — MONTELUKAST SODIUM 10 MG/1
10 TABLET ORAL
COMMUNITY
Start: 2017-02-08

## 2018-05-08 RX ORDER — ARMODAFINIL 250 MG/1
TABLET ORAL
COMMUNITY

## 2018-05-08 RX ORDER — NAPROXEN 500 MG/1
TABLET ORAL
COMMUNITY

## 2018-05-08 RX ORDER — OXYCODONE HYDROCHLORIDE AND ACETAMINOPHEN 5; 325 MG/1; MG/1
TABLET ORAL
COMMUNITY

## 2018-05-08 RX ORDER — IBUPROFEN 600 MG/1
TABLET ORAL
COMMUNITY

## 2018-05-08 RX ORDER — TERBINAFINE HYDROCHLORIDE 250 MG/1
TABLET ORAL
COMMUNITY

## 2018-05-08 RX ORDER — ASCORBIC ACID 125 MG
TABLET,CHEWABLE ORAL
COMMUNITY

## 2018-05-08 RX ORDER — ONDANSETRON 4 MG/1
TABLET, ORALLY DISINTEGRATING ORAL
COMMUNITY

## 2018-05-08 RX ORDER — OXYCODONE HYDROCHLORIDE 5 MG/1
TABLET ORAL
COMMUNITY
Start: 2018-05-02

## 2018-05-08 RX ORDER — ALBUTEROL SULFATE 90 UG/1
AEROSOL, METERED RESPIRATORY (INHALATION)
COMMUNITY

## 2018-05-09 ENCOUNTER — OFFICE VISIT (OUTPATIENT)
Dept: PULMONOLOGY | Facility: CLINIC | Age: 58
End: 2018-05-09
Payer: COMMERCIAL

## 2018-05-09 VITALS
OXYGEN SATURATION: 96 % | HEIGHT: 68 IN | TEMPERATURE: 97 F | SYSTOLIC BLOOD PRESSURE: 122 MMHG | WEIGHT: 265 LBS | HEART RATE: 75 BPM | BODY MASS INDEX: 40.16 KG/M2 | RESPIRATION RATE: 18 BRPM | DIASTOLIC BLOOD PRESSURE: 88 MMHG

## 2018-05-09 DIAGNOSIS — K21.9 GASTROESOPHAGEAL REFLUX DISEASE WITHOUT ESOPHAGITIS: ICD-10-CM

## 2018-05-09 DIAGNOSIS — Z72.0 TOBACCO ABUSE: ICD-10-CM

## 2018-05-09 DIAGNOSIS — J30.9 ALLERGIC RHINITIS, UNSPECIFIED SEASONALITY, UNSPECIFIED TRIGGER: ICD-10-CM

## 2018-05-09 DIAGNOSIS — R05.9 COUGH: Primary | ICD-10-CM

## 2018-05-09 PROCEDURE — 94010 BREATHING CAPACITY TEST: CPT | Performed by: INTERNAL MEDICINE

## 2018-05-09 PROCEDURE — 99214 OFFICE O/P EST MOD 30 MIN: CPT | Performed by: INTERNAL MEDICINE

## 2018-05-09 NOTE — PROGRESS NOTES
Pulmonary Follow Up Note   Lis Michael 56 y o  male MRN: [id number]  5/9/2018      Assessment:  1  Chronic cough - now resolved  · Suspect related to UACS/GERD and acute bronchitis exacerbated by tobacco abuse  · Now resolved  · Hold all inhaler therapies - normal spirometery  · Monitor for return and follow up prn    2  Intermittent GERD - H2 blockade with OTC pepcid prn for symptoms  3  Upper airway cough syndrome/allergic rhinitis   · Encouraged trial of neilmed sinus rinse kit    4  LISA on CPAP - follow up with sleep physician and continue good cPAP compliance    5  Tobacco abuse  · Encouraged complete tobacco cessation, he is not interested and pre-contemplative at this time        Plan:    Diagnoses and all orders for this visit:    Cough  -     POCT spirometry    Gastroesophageal reflux disease without esophagitis    Allergic rhinitis, unspecified seasonality, unspecified trigger    Tobacco abuse    Other orders  -     Menaquinone-7 (VITAMIN K2) 100 MCG CAPS; Take by mouth  -     albuterol (VENTOLIN HFA) 90 mcg/act inhaler; Ventolin HFA 90 mcg/actuation aerosol inhaler  -     terbinafine (LamISIL) 250 mg tablet; terbinafine HCl 250 mg tablet  -     oxyCODONE-acetaminophen (PERCOCET) 5-325 mg per tablet; oxycodone-acetaminophen 5 mg-325 mg tablet  -     oxyCODONE (ROXICODONE) 5 mg immediate release tablet; Earliest Fill Date: 5/2/18   -     ondansetron (ZOFRAN-ODT) 4 mg disintegrating tablet; ondansetron 4 mg disintegrating tablet  -     Armodafinil (NUVIGIL) 250 MG tablet; Nuvigil 250 mg tablet  -     naproxen (NAPROSYN) 500 mg tablet; naproxen 500 mg tablet  -     montelukast (SINGULAIR) 10 mg tablet; Take 10 mg by mouth  -     ibuprofen (MOTRIN) 600 mg tablet; ibuprofen 600 mg tablet  -     BuPROPion HBr ER (APLENZIN) 174 MG TB24; Aplenzin 174 mg tablet,extended release  -     Ascorbic Acid, Vitamin C, (VITAMIN C) 100 MG tablet; Take 100 mg by mouth        No Follow-up on file      History of Present Illness   HPI:  Lanette Kam is a 56 y o  male who has history of LISA on CPAP, UACS, GERD, and tobacco abuse with cigars presents for follow up of bronchitis and chronic cough  He was admitted 2/19-20 2018 at [facility] for cough and dyspnea  Evaluation at that time were to add symbicort, increase GERD therapies and UACS therapies with claritin and flonase  He presents today for follow up  He reports feeling improved and at baseline  He reports scant cough in AM with nasal drainage  He denied chest pain, no hemoptysis, no pleurisy, no dyspnea, no wheeze, no nocturnal dyspnea  He is compliant with CPAP  He denied dysphagia or odynophagia  He denied GERD symptoms  He continues to smoke 3-5 cigars a week and is not interested in quitting  He has stopped all inhalers, tessalon, pepcid, and singulair  He had knee surgery last week without issues  Review of Systems   Constitutional: Negative for activity change, fatigue, fever and unexpected weight change  HENT: Positive for postnasal drip and rhinorrhea  Negative for nosebleeds, sinus pressure, sore throat, trouble swallowing and voice change  Respiratory: Negative for cough, chest tightness, shortness of breath and wheezing  Cardiovascular: Negative for chest pain, palpitations and leg swelling  Gastrointestinal: Negative for abdominal pain, nausea and vomiting  Musculoskeletal: Positive for arthralgias and gait problem  Right knee surgery   Skin: Negative for rash  Neurological: Negative for weakness and headaches  Hematological: Negative for adenopathy  Psychiatric/Behavioral: Negative for sleep disturbance         Historical Information   Past Medical History:   Diagnosis Date    Depression     GERD (gastroesophageal reflux disease)     History of kidney stones     kidney stones    LISA on CPAP      Past Surgical History:   Procedure Laterality Date    APPENDECTOMY      HERNIA REPAIR       Family History   Problem Relation Age of Onset    Heart attack Maternal Grandmother     Lung disease Neg Hx          Meds/Allergies     Current Outpatient Prescriptions:     albuterol (VENTOLIN HFA) 90 mcg/act inhaler, Ventolin HFA 90 mcg/actuation aerosol inhaler, Disp: , Rfl:     Armodafinil (NUVIGIL) 250 MG tablet, Nuvigil 250 mg tablet, Disp: , Rfl:     Ascorbic Acid, Vitamin C, (VITAMIN C) 100 MG tablet, Take 100 mg by mouth, Disp: , Rfl:     Brexpiprazole (REXULTI) 1 MG TABS, Take 1 mg by mouth daily, Disp: , Rfl:     BuPROPion HBr ER (APLENZIN) 174 MG TB24, Aplenzin 174 mg tablet,extended release, Disp: , Rfl:     fluticasone (FLONASE) 50 mcg/act nasal spray, 2 sprays into each nostril daily, Disp: 16 g, Rfl: 0    hydrochlorothiazide (HYDRODIURIL) 12 5 mg tablet, Take 12 5 mg by mouth daily, Disp: , Rfl:     ibuprofen (MOTRIN) 600 mg tablet, ibuprofen 600 mg tablet, Disp: , Rfl:     Menaquinone-7 (VITAMIN K2) 100 MCG CAPS, Take by mouth, Disp: , Rfl:     montelukast (SINGULAIR) 10 mg tablet, Take 10 mg by mouth, Disp: , Rfl:     naproxen (NAPROSYN) 500 mg tablet, naproxen 500 mg tablet, Disp: , Rfl:     benzonatate (TESSALON PERLES) 100 mg capsule, Take 1 capsule (100 mg total) by mouth 3 (three) times a day as needed for cough, Disp: 20 capsule, Rfl: 0    budesonide-formoterol (SYMBICORT) 160-4 5 mcg/act inhaler, Inhale 2 puffs 2 (two) times a day, Disp: 1 Inhaler, Rfl: 0    famotidine (PEPCID) 20 mg tablet, Take 1 tablet (20 mg total) by mouth daily at bedtime, Disp: 30 tablet, Rfl: 0    loratadine (CLARITIN) 10 mg tablet, Take 1 tablet (10 mg total) by mouth daily, Disp: 30 tablet, Rfl: 0    ondansetron (ZOFRAN-ODT) 4 mg disintegrating tablet, ondansetron 4 mg disintegrating tablet, Disp: , Rfl:     oxyCODONE (ROXICODONE) 5 mg immediate release tablet, , Disp: , Rfl:     oxyCODONE-acetaminophen (PERCOCET) 5-325 mg per tablet, oxycodone-acetaminophen 5 mg-325 mg tablet, Disp: , Rfl:     terbinafine (LamISIL) 250 mg tablet, terbinafine HCl 250 mg tablet, Disp: , Rfl:   Allergies   Allergen Reactions    Citalopram     Wellbutrin Xl [Bupropion Hcl Er (Xl)]        Vitals: Blood pressure 122/88, pulse 75, temperature (!) 97 °F (36 1 °C), temperature source Tympanic, resp  rate 18, height 5' 8" (1 727 m), weight 120 kg (265 lb), SpO2 96 %  Body mass index is 40 29 kg/m²  Oxygen Therapy  SpO2: 96 % (ra)      Physical Exam  Physical Exam   Constitutional: He is oriented to person, place, and time  He appears well-developed and well-nourished  No distress  Obese otherwise normal   HENT:   Head: Normocephalic and atraumatic  Right Ear: External ear normal    Left Ear: External ear normal    Mouth/Throat: No oropharyngeal exudate  Mild posterior pharyngeal cobblestoning  Mild nasal turbinate bogginess   Eyes: Conjunctivae are normal  Pupils are equal, round, and reactive to light  Right eye exhibits no discharge  Left eye exhibits no discharge  No scleral icterus  Neck: Neck supple  No JVD present  No tracheal deviation present  Cardiovascular: Normal rate, regular rhythm and normal heart sounds  No murmur heard  Pulmonary/Chest: Effort normal and breath sounds normal  No stridor  No respiratory distress  He has no wheezes  He has no rales  Abdominal: He exhibits no distension  There is no tenderness  Musculoskeletal: He exhibits deformity  He exhibits no edema  Right knee bandage in place   Lymphadenopathy:     He has no cervical adenopathy  Neurological: He is alert and oriented to person, place, and time  Skin: Skin is warm and dry  No rash noted  Psychiatric: He has a normal mood and affect  His behavior is normal    Vitals reviewed  Labs: I have personally reviewed pertinent lab results    Lab Results   Component Value Date    WBC 5 67 02/19/2018    HGB 15 4 02/19/2018    HCT 44 8 02/19/2018    MCV 85 02/19/2018     02/19/2018     Lab Results   Component Value Date    GLUCOSE 103 02/19/2018 CALCIUM 8 6 2018     2018    K 4 2 2018    CO2 28 2018     2018    BUN 18 2018    CREATININE 1 02 2018     No results found for: IGE  Lab Results   Component Value Date    ALT 39 2018    AST 22 2018    ALKPHOS 72 2018    BILITOT 0 60 2018     Pulmonary function testin/9/18 - Spirometry - Ratio 75%, FVC 5 26L (115%), FEV1 3 96L (112%) - normal spirometry    EKG, Pathology, and Other Studies: I have personally reviewed pertinent reports  TTE 18 - EF 65%, RV TOBI Mcdowell DO, [facility]

## 2018-05-09 NOTE — PATIENT INSTRUCTIONS
Chronic Cough   AMBULATORY CARE:   A chronic cough  is a cough that lasts more than 4 weeks in children or 8 weeks in adults  Signs and symptoms you may also have:   · Wheezing and shortness of breath    · A runny or stuffy nose    · Pain or itching in your throat    · Red, swollen, watery eyes    · A raspy or hoarse voice    · Heartburn or a sour taste in your mouth  Call 911 for any of the following:   · You cough up blood  · You faint when you cough  · You have trouble breathing  Contact your healthcare provider if:   · You have new or worsening symptoms  · You have severe pain when you take a deep breath  · You become very tired after a coughing fit  · You have trouble sleeping because of the coughing  · You have questions or concerns about your condition or care  Treatment for a chronic cough  may depend on the cause  You may need medicine to stop your cough, treat allergies or acid reflux, or decrease swelling in your airways  You will need antibiotics if your cough is caused by a respiratory infection  If you take medicine that causes a chronic cough, it may be stopped or changed  You may need speech therapy  A speech therapist can teach you ways to control your cough  Self-care:   · Prevent acid reflex  Acid reflux can make your chronic cough worse  Raise your head and upper back when you sleep  Place 2 or more pillows behind your head or sleep in a recliner  Do not lie down for at least 1 hour after you eat  Do not have foods or drinks that increase heartburn  Ask your healthcare provider for other ways to prevent acid reflux  · Do not smoke  Encourage your adolescent child not to smoke  Nicotine and other chemicals in cigarettes and cigars can cause lung damage  They can also make your cough worse  Ask your healthcare provider for information if you currently smoke and need help to quit  E-cigarettes or smokeless tobacco still contain nicotine   Talk to your healthcare provider before you use these products  · Stay away from secondhand smoke  Do not let people smoke in your car, home, or near your child  Do not stand near someone that is smoking  This includes anyone that is smoking an E-cigarrete  · Avoid anything that triggers your allergies or irritates your throat  Allergens and irritants can make your chronic cough worse  Allergens may include dust mites, pollen, pet dander, or mold  Wear a mask if you work around pollutants or irritants  Ask your healthcare provider for more ways to decrease your exposure to allergens or irritants  · Drink plenty of liquids as directed  Liquids may help relieve throat discomfort that causes you to cough  Add honey to tea or hot water to help ease your throat pain  Ask how much liquid to drink each day and which liquids are best for you  Follow up with your healthcare provider as directed: You may need to return for more tests  Your healthcare provider may refer to you other specialists  Write down your questions so you remember to ask them during your visits  © 2017 [facility] Information is for End User's use only and may not be sold, redistributed or otherwise used for commercial purposes  All illustrations and images included in CareNotes® are the copyrighted property of [employer]  or Ari Cabrera  The above information is an  only  It is not intended as medical advice for individual conditions or treatments  Talk to your doctor, nurse or pharmacist before following any medical regimen to see if it is safe and effective for you

## 2018-05-10 ENCOUNTER — TRANSCRIBE ORDERS (OUTPATIENT)
Dept: PHYSICAL THERAPY | Facility: REHABILITATION | Age: 58
End: 2018-05-10

## 2018-05-10 ENCOUNTER — OFFICE VISIT (OUTPATIENT)
Dept: PHYSICAL THERAPY | Facility: REHABILITATION | Age: 58
End: 2018-05-10
Payer: COMMERCIAL

## 2018-05-10 DIAGNOSIS — Z98.890 S/P LATERAL MENISCUS REPAIR OF RIGHT KNEE: Primary | ICD-10-CM

## 2018-05-10 PROCEDURE — 97112 NEUROMUSCULAR REEDUCATION: CPT | Performed by: PHYSICAL THERAPIST

## 2018-05-10 PROCEDURE — 97110 THERAPEUTIC EXERCISES: CPT | Performed by: PHYSICAL THERAPIST

## 2018-05-10 NOTE — PROGRESS NOTES
Daily Note     Today's date: 5/10/2018  Patient name: Justin Nava  : 1960  MRN: [id number]  Referring provider: Vessie Meigs, MD  Dx:   Encounter Diagnosis     ICD-10-CM    1  S/P lateral meniscus repair of right knee Z98 890                   Subjective: Pt reports that he has bruising on the medial aspect of his knee  Pt was questioned as to the nature of his surgery and reported that he was unsure of the exact surgical procedure and they just "scrapped stuff out"  States he is unaware of any surgical precautions  Objective: See treatment diary below    Precautions: back pain, recent surgery     Daily Treatment Diary      Manual  5/3  5/7  5/10                 Quad stretch, jose position 10x10"  10x10"  NP                 Dressing change FB                                                                                                   Exercise Diary  5/3  5/7  5/10                 Quad set 2x10, 5"  1x10, 5" (D/C HEP)  HEP                 HEP - Prone quad stretch, Seated calf stretch, seated heel slide, calf raise, LAQ AROM, quad set 10x10"    HEP                 LAQ ROM 2x30  3x10, 5" OTB  3x10 OTB                 glute sets 2x10, 5"  HEP                   Clam shell 2x10, 5"  1x10, 5"  2x10                   recumbent bike    10' 10'                  SLR flexion   2x10, 3"  3x10                  seated heel slide   5'  5' supine                  total gym lvl 4 (0-60 deg )   8'  NP                  hip add      2x10                                                            Modalities                                                                                                    Assessment: Incisions clean, no bruising noted however significant edema on the lateral aspect of the knee  Weight bearing held this visit secondary to edema and questions regarding surgical procedure  Pt was able to perform TE without complaint   Good quad contraction observed however pt did exhibit fatigue with contraction  Plan: Continue per plan of care

## 2018-05-14 ENCOUNTER — OFFICE VISIT (OUTPATIENT)
Dept: PHYSICAL THERAPY | Facility: REHABILITATION | Age: 58
End: 2018-05-14
Payer: COMMERCIAL

## 2018-05-14 ENCOUNTER — TRANSCRIBE ORDERS (OUTPATIENT)
Dept: PHYSICAL THERAPY | Facility: REHABILITATION | Age: 58
End: 2018-05-14

## 2018-05-14 DIAGNOSIS — Z98.890 S/P LATERAL MENISCUS REPAIR OF RIGHT KNEE: Primary | ICD-10-CM

## 2018-05-14 PROCEDURE — 97112 NEUROMUSCULAR REEDUCATION: CPT

## 2018-05-14 PROCEDURE — 97110 THERAPEUTIC EXERCISES: CPT

## 2018-05-14 PROCEDURE — 97140 MANUAL THERAPY 1/> REGIONS: CPT

## 2018-05-14 NOTE — PROGRESS NOTES
Daily Note     Today's date: 2018  Patient name: Ny Norris  : 1960  MRN: [id number]  Referring provider: Sherif Agudelo MD  Dx:   Encounter Diagnosis     ICD-10-CM    1  S/P lateral meniscus repair of right knee Z98 890                   Subjective: Pt  reports no change in status upon arrival, states that swelling in knee is showing minor improvement  Pt reports taking ibuprofen for pain and swelling  Objective: See treatment diary below    Precautions: back pain, recent surgery     Daily Treatment Diary      Manual  5/3  5/7  5/10  5/14               Quad stretch, jose position 10x10"  10x10"  NP  10x10"               Dressing change FB                                                                                                   Exercise Diary  5/3  5/7  5/10  5/14               Quad set 2x10, 5"  1x10, 5" (D/C HEP)  HEP                 HEP - Prone quad stretch, Seated calf stretch, seated heel slide, calf raise, LAQ AROM, quad set 10x10"    HEP                 LAQ ROM 2x30  3x10, 5" OTB  3x10 OTB  3x10 OTB               glute sets 2x10, 5"  HEP                   Clam shell 2x10, 5"  1x10, 5"  2x10   2x10                recumbent bike    10' 10'  10'                SLR flexion   2x10, 3"  3x10  3x10 3"                seated heel slide   5'  5' supine  5' supine                total gym lvl 4 (0-60 deg )   8'  NP  8'                hip add      2x10  2x10                                                          Modalities                                                                                                    Assessment: Tolerated treatment well  Patient would benefit from continued PT  Pt swelling is showing some improvement  Incision site healing well, pt no longer using bandages, states that incisions are  to pressure but good otherwise  Pt had good tolerance to exercise    Pt  able to complete all exercises with no increase in pain during or after session  Pt  1:1 with PTA for entirety  Plan: Continue per plan of care

## 2018-05-17 ENCOUNTER — OFFICE VISIT (OUTPATIENT)
Dept: PHYSICAL THERAPY | Facility: REHABILITATION | Age: 58
End: 2018-05-17
Payer: COMMERCIAL

## 2018-05-17 DIAGNOSIS — Z98.890 S/P LATERAL MENISCUS REPAIR OF RIGHT KNEE: Primary | ICD-10-CM

## 2018-05-17 PROCEDURE — 97110 THERAPEUTIC EXERCISES: CPT | Performed by: PHYSICAL THERAPIST

## 2018-05-17 PROCEDURE — 97112 NEUROMUSCULAR REEDUCATION: CPT | Performed by: PHYSICAL THERAPIST

## 2018-05-17 NOTE — PROGRESS NOTES
Daily Note     Today's date: 2018  Patient name: Georg Bence  : 1960  MRN: [id number]  Referring provider: Makayla Washburn MD  Dx:   Encounter Diagnosis     ICD-10-CM    1  S/P lateral meniscus repair of right knee Z98 890        Start Time: 1200  Stop Time: 1250  Total time in clinic (min): 50 minutes    Subjective: Pt reports that he tried biking on his own but caused soreness  Objective: See treatment diary below    Precautions: back pain, recent surgery     Daily Treatment Diary      Manual  5/3  5/7  5/10  5/14  5/17             Quad stretch, jose position 10x10"  10x10"  NP  10x10"  10x10"             Dressing change FB                                                                                                   Exercise Diary  5/3  5/7  5/10  5/14  5/17             HEP - Prone quad stretch, Seated calf stretch, seated heel slide, calf raise, LAQ AROM, quad set 10x10"    HEP                 LAQ ROM 2x30  3x10, 5" OTB  3x10 OTB  3x10 OTB  2x10, 5" 3#             Clam shell 2x10, 5"  1x10, 5"  2x10   2x10  np              recumbent bike    10' 10'  10'  10'              SLR flexion   2x10, 3"  3x10  3x10 3"  1x10, 5" PT assist              seated heel slide   5'  5' supine  5' supine  HEP              total gym lvl 4 (0-60 deg )   8'  NP  8'  NP              hip add/extn      2x10  2x10  2x10 ea               quad set NMES 10 on/25 off- 10'          FB             Lat step down green     3x10              Modalities                                                                                                    Assessment: Difficulty demonstrating straight leg raise without slight quad lag  Cueing needed for gluteal activation with step downs  Also educated patient to avoid knee bend when trying to perform SLS at home  Plan: Progress to jogging in treadmill

## 2018-05-23 ENCOUNTER — OFFICE VISIT (OUTPATIENT)
Dept: PHYSICAL THERAPY | Facility: REHABILITATION | Age: 58
End: 2018-05-23
Payer: COMMERCIAL

## 2018-05-23 ENCOUNTER — APPOINTMENT (OUTPATIENT)
Dept: PHYSICAL THERAPY | Facility: REHABILITATION | Age: 58
End: 2018-05-23
Payer: COMMERCIAL

## 2018-05-23 DIAGNOSIS — Z98.890 S/P LATERAL MENISCUS REPAIR OF RIGHT KNEE: Primary | ICD-10-CM

## 2018-05-23 PROCEDURE — 97112 NEUROMUSCULAR REEDUCATION: CPT | Performed by: PHYSICAL THERAPIST

## 2018-05-23 PROCEDURE — 97113 AQUATIC THERAPY/EXERCISES: CPT | Performed by: PHYSICAL THERAPIST

## 2018-05-23 PROCEDURE — 97110 THERAPEUTIC EXERCISES: CPT | Performed by: PHYSICAL THERAPIST

## 2018-05-23 NOTE — PROGRESS NOTES
Daily Note     Today's date: 2018  Patient name: Meredith Wilburn  : 1960  MRN: [id number]  Referring provider: Van Willoughby MD  Dx:   Encounter Diagnosis     ICD-10-CM    1  S/P lateral meniscus repair of right knee Z98 890        Start Time: 1100  Stop Time: 1200  Total time in clinic (min): 60 minutes    Subjective: Pt reports that he tried biking on his own but caused soreness  Objective: See treatment diary below    Precautions: back pain, recent surgery     Daily Treatment Diary      Manual  5/3  5/7  5/10  5/14  5/17  5/23           Quad stretch, jose position 10x10"  10x10"  NP  10x10"  10x10"             Dressing change FB                                                                                                   Exercise Diary  5/3  5/7  5/10  5/14  5/17  5/23           HEP - Prone quad stretch, Seated calf stretch, seated heel slide, calf raise, LAQ AROM, quad set 10x10"    HEP                 LAQ ROM 2x30  3x10, 5" OTB  3x10 OTB  3x10 OTB  2x10, 5" 3#  np           Clam shell 2x10, 5"  1x10, 5"  2x10   2x10  np  np            recumbent bike    10' 10'  10'  10'  10'            SLR flexion   2x10, 3"  3x10  3x10 3"  1x10, 5" PT assist  2x10            seated heel slide   5'  5' supine  5' supine  HEP  np            total gym lvl 6 (0-60 deg ) double leg   8'  NP  8'  NP  8'           total gym lvl 6 (0-60 deg ) single leg      2x10        hip add/extn      2x10  2x10  2x10 ea   np            quad set NMES 10 on/25 off- 10'          FB  FB           Lat step down green     3x10 np       Hydro             ----walk 2 5 mph fwd      10'       --walk 1 0 rev      2'             Modalities                                                                                                   Assessment: Patient demonstrated improved quad activation with treatment today  Unable to jog at this time in hydrotrack  Plan: Continue quad strengthening

## 2018-05-25 ENCOUNTER — OFFICE VISIT (OUTPATIENT)
Dept: PHYSICAL THERAPY | Facility: REHABILITATION | Age: 58
End: 2018-05-25
Payer: COMMERCIAL

## 2018-05-25 ENCOUNTER — APPOINTMENT (OUTPATIENT)
Dept: PHYSICAL THERAPY | Facility: REHABILITATION | Age: 58
End: 2018-05-25
Payer: COMMERCIAL

## 2018-05-25 DIAGNOSIS — Z98.890 S/P LATERAL MENISCUS REPAIR OF RIGHT KNEE: Primary | ICD-10-CM

## 2018-05-25 PROCEDURE — 97112 NEUROMUSCULAR REEDUCATION: CPT | Performed by: PHYSICAL THERAPIST

## 2018-05-25 PROCEDURE — G8991 OTHER PT/OT GOAL STATUS: HCPCS | Performed by: PHYSICAL THERAPIST

## 2018-05-25 PROCEDURE — 97110 THERAPEUTIC EXERCISES: CPT | Performed by: PHYSICAL THERAPIST

## 2018-05-25 PROCEDURE — 97113 AQUATIC THERAPY/EXERCISES: CPT | Performed by: PHYSICAL THERAPIST

## 2018-05-25 PROCEDURE — G8990 OTHER PT/OT CURRENT STATUS: HCPCS | Performed by: PHYSICAL THERAPIST

## 2018-05-25 NOTE — PROGRESS NOTES
Daily Note     Today's date: 2018  Patient name: Domenic Baker  : 1960  MRN: [id number]  Referring provider: Naresh Schaefer MD  Dx:   Encounter Diagnosis     ICD-10-CM    1  S/P lateral meniscus repair of right knee [id number] [id number]        Start Time: 1205  Stop Time: 1400  Total time in clinic (min): 65 minutes    Subjective: Pt reports that he tried biking on his own but caused soreness  Reports walking much better         Objective: See treatment diary below    Precautions: back pain, recent surgery     Daily Treatment Diary      Manual  5/3  5/7  5/10  5/14  5/17  5/23  5/25         Quad stretch, domenic position 10x10"  10x10"  NP  10x10"  10x10"             Dressing change FB                      patellar mob             FB                                                               Exercise Diary  5/3  5/7  5/10  5/14  5/17  5/23  5/25         HEP - Prone quad stretch, Seated calf stretch, seated heel slide, calf raise, LAQ AROM, quad set 10x10"    HEP                 LAQ ROM 2x30  3x10, 5" OTB  3x10 OTB  3x10 OTB  2x10, 5" 3#  np  2x10, 3", 5#         Clam shell 2x10, 5"  1x10, 5"  2x10   2x10  np  np  np          recumbent bike    10' 10'  10'  10'  10'  10'          SLR flexion   2x10, 3"  3x10  3x10 3"  1x10, 5" PT assist  2x10  2x10, 5"          seated heel slide   5'  5' supine  5' supine  HEP  np  np          total gym lvl 6 (0-60 deg ) double leg   8'  NP  8'  NP  8'  lvl 9, 3x10         total gym lvl 6 (0-60 deg ) single leg      2x10 lvl 9, 3x10 PT assist       hip add/extn      2x10  2x10  2x10 ea   np  np          quad set NMES 10 on/25 off- 10'          FB  FB  np         Lat step down green     3x10 np np      Hydro             ----walk 2 5 mph fwd      10' 10'      --walk 1 0 rev      2'       SAQ       2x10, 5", 2#      Hydro marching       4x30''            Modalities                                                                                                   Assessment: Patient demonstrated improved quad activation with treatment today  Did not perform NMES as able to control and prevent quad lag  Plan: Continue quad strengthening

## 2018-05-29 ENCOUNTER — APPOINTMENT (OUTPATIENT)
Dept: PHYSICAL THERAPY | Facility: REHABILITATION | Age: 58
End: 2018-05-29
Payer: COMMERCIAL

## 2018-05-29 ENCOUNTER — OFFICE VISIT (OUTPATIENT)
Dept: PHYSICAL THERAPY | Facility: REHABILITATION | Age: 58
End: 2018-05-29
Payer: COMMERCIAL

## 2018-05-29 DIAGNOSIS — Z98.890 S/P LATERAL MENISCUS REPAIR OF RIGHT KNEE: Primary | ICD-10-CM

## 2018-05-29 PROCEDURE — 97112 NEUROMUSCULAR REEDUCATION: CPT

## 2018-05-29 PROCEDURE — 97110 THERAPEUTIC EXERCISES: CPT

## 2018-05-29 PROCEDURE — 97113 AQUATIC THERAPY/EXERCISES: CPT

## 2018-05-29 NOTE — PROGRESS NOTES
Daily Note     Today's date: 2018  Patient name: Wilmer Mora  : 1960  MRN: [id number]  Referring provider: Yany Estrella MD  Dx:   Encounter Diagnosis     ICD-10-CM    1  S/P lateral meniscus repair of right knee [id number] 890                   Subjective: Pt reports that his knee has been doing well lately  1 on 1 with PT/PTA  Objective: See treatment diary below    Precautions: back pain, recent surgery     Daily Treatment Diary      Manual  5/3  5/7  5/10  5/14  5/17  5/23  5/25         Quad stretch, jose position 10x10"  10x10"  NP  10x10"  10x10"             Dressing change FB                      patellar mob             FB                                                               Exercise Diary  5/3  5/7  5/10  5/14  5/17  5/23  5/25  5/29       HEP - Prone quad stretch, Seated calf stretch, seated heel slide, calf raise, LAQ AROM, quad set 10x10"    HEP                 LAQ ROM 2x30  3x10, 5" OTB  3x10 OTB  3x10 OTB  2x10, 5" 3#  np  2x10, 3", 5#  2x10  3" 5#       Clam shell 2x10, 5"  1x10, 5"  2x10   2x10  np  np  np  np        recumbent bike    10' 10'  10'  10'  10'  10'  8'        SLR flexion   2x10, 3"  3x10  3x10 3"  1x10, 5" PT assist  2x10  2x10, 5"  5"  2x10        seated heel slide   5'  5' supine  5' supine  HEP  np  np  np        total gym lvl 6 (0-60 deg ) double leg   8'  NP  8'  NP  8'  lvl 9, 3x10  Lvl 9  3x10       total gym lvl 6 (0-60 deg ) single leg      2x10 lvl 9, 3x10 PT assist Lvl 9  3x10      hip add/extn      2x10  2x10  2x10 ea   np  np  np        quad set NMES 10 on/25 off- 10'          FB  FB  np  np       Lat step down green     3x10 np np 3x10     Hydro             ----walk 2 5 mph fwd      10' 10' 10'     --- jogging 3  5mph        5'     --walk 1 0 rev      2'       SAQ       2x10, 5", 2#      Hydro marching       4x30''      SLS ball toss        2x15           Modalities                                                                                                   Assessment: Pt able to tolerate light jog in hydro this session  Plan: Continue quad strengthening

## 2018-05-31 ENCOUNTER — APPOINTMENT (OUTPATIENT)
Dept: PHYSICAL THERAPY | Facility: REHABILITATION | Age: 58
End: 2018-05-31
Payer: COMMERCIAL

## 2018-05-31 ENCOUNTER — OFFICE VISIT (OUTPATIENT)
Dept: PHYSICAL THERAPY | Facility: REHABILITATION | Age: 58
End: 2018-05-31
Payer: COMMERCIAL

## 2018-05-31 DIAGNOSIS — Z98.890 S/P LATERAL MENISCUS REPAIR OF RIGHT KNEE: Primary | ICD-10-CM

## 2018-05-31 PROCEDURE — 97110 THERAPEUTIC EXERCISES: CPT

## 2018-05-31 PROCEDURE — 97113 AQUATIC THERAPY/EXERCISES: CPT

## 2018-05-31 PROCEDURE — 97112 NEUROMUSCULAR REEDUCATION: CPT

## 2018-05-31 PROCEDURE — 97530 THERAPEUTIC ACTIVITIES: CPT

## 2018-05-31 NOTE — PROGRESS NOTES
Daily Note     Today's date: 2018  Patient name: Bryant Miles  : 1960  MRN: [id number]  Referring provider: Wilfredo Chase MD  Dx:   Encounter Diagnosis     ICD-10-CM    1  S/P lateral meniscus repair of right knee Z98 890                   Subjective: Patient reports going for an hour long bike ride with no c/o pain during or post    1:1 60 min indep TE remaining    Objective: See treatment diary below    Precautions: back pain, recent surgery     Daily Treatment Diary      Manual  5/3  5/7  5/10  5/14  5/17  5/23  5/25         Quad stretch, joes position 10x10"  10x10"  NP  10x10"  10x10"             Dressing change FB                      patellar mob             FB                                                               Exercise Diary  5/3  5/7  5/10  5/14  5/17  5/23  5/25  5/29  5/31     HEP - Prone quad stretch, Seated calf stretch, seated heel slide, calf raise, LAQ AROM, quad set 10x10"    HEP                 LAQ ROM 2x30  3x10, 5" OTB  3x10 OTB  3x10 OTB  2x10, 5" 3#  np  2x10, 3", 5#  2x10  3" 5#  PTB  5" 3x10     Clam shell 2x10, 5"  1x10, 5"  2x10   2x10  np  np  np  np        recumbent bike    10' 10'  10'  10'  10'  10'  8'  10'      SLR flexion   2x10, 3"  3x10  3x10 3"  1x10, 5" PT assist  2x10  2x10, 5"  5"  2x10  Long sit 5"  3x10      seated heel slide   5'  5' supine  5' supine  HEP  np  np  np        total gym lvl 6 (0-60 deg  )NO MORE THAN 60* FLEX double leg   8'  NP  8'  NP  8'  lvl 9, 3x10  Lvl 9  3x10  Lvl 9  3x10     total gym lvl 6 (0-60 deg ) single leg NO MORE THAN 60* FLEX      2x10 lvl 9, 3x10 PT assist Lvl 9  3x10 Lvl 9  3x10    Quad sets         5# 5"  2x10     hip add/extn      2x10  2x10  2x10 ea   np  np  np        quad set NMES 10 on/25 off- 10'          FB  FB  np  np       Lat step down green     3x10 np np 3x10 3x10    Hydro             ----walk  fwd      10' 10' 10' 3 0  5'    --- jogging         5' 4 0  7'    Side shuffle         1 5 3' ea --walk 1 0 rev      2'       Hydro marching       4x30''      SLS ball toss        2x15           Modalities                                                                                                   Assessment: Pt did well with increased speed and endurance for jogging as well as increased speed for side shuffling  Reports some pain with total gym today  Plan: Continue quad strengthening

## 2018-06-05 ENCOUNTER — APPOINTMENT (OUTPATIENT)
Dept: PHYSICAL THERAPY | Facility: REHABILITATION | Age: 58
End: 2018-06-05
Payer: COMMERCIAL

## 2018-06-05 ENCOUNTER — OFFICE VISIT (OUTPATIENT)
Dept: PHYSICAL THERAPY | Facility: REHABILITATION | Age: 58
End: 2018-06-05
Payer: COMMERCIAL

## 2018-06-05 DIAGNOSIS — Z98.890 S/P LATERAL MENISCUS REPAIR OF RIGHT KNEE: Primary | ICD-10-CM

## 2018-06-05 PROCEDURE — 97110 THERAPEUTIC EXERCISES: CPT

## 2018-06-05 PROCEDURE — 97112 NEUROMUSCULAR REEDUCATION: CPT

## 2018-06-05 PROCEDURE — 97530 THERAPEUTIC ACTIVITIES: CPT

## 2018-06-05 NOTE — PROGRESS NOTES
Daily Note     Today's date: 2018  Patient name: Sarai Zhu  : 1960  MRN: [id number]  Referring provider: Anayeli Velazco MD  Dx:   Encounter Diagnosis     ICD-10-CM    1  S/P lateral meniscus repair of right knee Z98 890                   Subjective: Patient reports going for an hour long bike ride with no c/o pain during or post    1:1 w/ PTA    Objective: See treatment diary below    Precautions: back pain, recent surgery     Daily Treatment Diary      Manual  5/3  5/7  5/10  5/14  5/17  5/23  5/25         Quad stretch, jose position 10x10"  10x10"  NP  10x10"  10x10"             Dressing change FB                      patellar mob             FB                                                               Exercise Diary  5/3  5/7  5/10  5/14  5/17  5/23  5/25  5/29  5/31  6/5   HEP - Prone quad stretch, Seated calf stretch, seated heel slide, calf raise, LAQ AROM, quad set 10x10"    HEP                 LAQ ROM 2x30  3x10, 5" OTB  3x10 OTB  3x10 OTB  2x10, 5" 3#  np  2x10, 3", 5#  2x10  3" 5#  PTB  5" 3x10     Clam shell 2x10, 5"  1x10, 5"  2x10   2x10  np  np  np  np        recumbent bike    10' 10'  10'  10'  10'  10'  8'  10' 5'    SLR flexion   2x10, 3"  3x10  3x10 3"  1x10, 5" PT assist  2x10  2x10, 5"  5"  2x10  Long sit 5"  3x10  5" 3x10  2#    seated heel slide   5'  5' supine  5' supine  HEP  np  np  np        total gym lvl 6 (0-60 deg  )NO MORE THAN 60* FLEX double leg   8'  NP  8'  NP  8'  lvl 9, 3x10  Lvl 9  3x10  Lvl 9  3x10  3x10   total gym lvl 6 (0-60 deg ) single leg NO MORE THAN 60* FLEX      2x10 lvl 9, 3x10 PT assist Lvl 9  3x10 Lvl 9  3x10 3x10   Quad sets         5# 5"  2x10 5# 5"  3x10    hip add/extn      2x10  2x10  2x10 ea   np  np  np        quad set NMES 10 on/25 off- 10'          FB  FB  np  np       Lat step down green     3x10 np np 3x10 3x10 3x10   Hydro             ----walk  fwd      10' 10' 10' 3 0  5'    --- jogging         5' 4 0  7'    Side shuffle 1 5 3' ea    --walk 1 0 rev      2'       Hydro marching       4x30''      SLS ball toss        2x15     Treadmill jogging          4 0mph 5'   squats          3x10                      Modalities                                                                                                   Assessment: Pt reports discomfort with jogging today  Plan: Continue quad strengthening

## 2018-06-07 ENCOUNTER — APPOINTMENT (OUTPATIENT)
Dept: PHYSICAL THERAPY | Facility: REHABILITATION | Age: 58
End: 2018-06-07
Payer: COMMERCIAL

## 2018-06-07 ENCOUNTER — OFFICE VISIT (OUTPATIENT)
Dept: PHYSICAL THERAPY | Facility: REHABILITATION | Age: 58
End: 2018-06-07
Payer: COMMERCIAL

## 2018-06-07 DIAGNOSIS — Z98.890 S/P LATERAL MENISCUS REPAIR OF RIGHT KNEE: Primary | ICD-10-CM

## 2018-06-07 PROCEDURE — 97530 THERAPEUTIC ACTIVITIES: CPT

## 2018-06-07 PROCEDURE — 97110 THERAPEUTIC EXERCISES: CPT

## 2018-06-07 PROCEDURE — 97112 NEUROMUSCULAR REEDUCATION: CPT

## 2018-06-07 NOTE — PROGRESS NOTES
Daily Note     Today's date: 2018  Patient name: Lis Michael  : 1960  MRN: [id number]  Referring provider: Merissa Terry MD  Dx:   Encounter Diagnosis     ICD-10-CM    1  S/P lateral meniscus repair of right knee Z98 890                   Subjective: Patient has c/o back pain today  1:1 w/ PT/PTA    Objective: See treatment diary below    Precautions: back pain, recent surgery     Daily Treatment Diary      Manual  5/3  5/7  5/10  5/14  5/17  5/23  5/25         Quad stretch, jose position 10x10"  10x10"  NP  10x10"  10x10"             Dressing change FB                      patellar mob             FB                                                               Exercise Diary  5/3  5/7  5/10  5/14  5/17  5/23  5/25  5/29  5/31  6/5     HEP - Prone quad stretch, Seated calf stretch, seated heel slide, calf raise, LAQ AROM, quad set 10x10"    HEP                   LAQ ROM 2x30  3x10, 5" OTB  3x10 OTB  3x10 OTB  2x10, 5" 3#  np  2x10, 3", 5#  2x10  3" 5#  PTB  5" 3x10       Clam shell 2x10, 5"  1x10, 5"  2x10   2x10  np  np  np  np          recumbent bike    10' 10'  10'  10'  10'  10'  8'  10' 5' 10'     SLR flexion   2x10, 3"  3x10  3x10 3"  1x10, 5" PT assist  2x10  2x10, 5"  5"  2x10  Long sit 5"  3x10  5" 3x10  2# 5" 3x10     seated heel slide   5'  5' supine  5' supine  HEP  np  np  np          total gym lvl 6 (0-60 deg  )NO MORE THAN 60* FLEX double leg   8'  NP  8'  NP  8'  lvl 9, 3x10  Lvl 9  3x10  Lvl 9  3x10  3x10     total gym lvl 6 (0-60 deg ) single leg NO MORE THAN 60* FLEX      2x10 lvl 9, 3x10 PT assist Lvl 9  3x10 Lvl 9  3x10 3x10     Quad sets         5# 5"  2x10 5# 5"  3x10      hip add/extn      2x10  2x10  2x10 ea   np  np  np          quad set NMES 10 on/25 off- 10'          FB  FB  np  np         Lat step down green     3x10 np np 3x10 3x10 3x10 3x10    Hydro               ----walk  fwd      10' 10' 10' 3 0  5'      --- jogging         5' 4 0  7'      Side shuffle 1 5 3' ea      --walk 1 0 rev      2'         Hydro marching       4x30''        SLS ball toss        2x15       Treadmill jogging          4 0mph 5' 4 5mph  5'    squats          3x10     TB side stepping           OTB fatigue    BOSU step overs           2x10    BOSU squats           3" 2x10          Modalities                                                                                                   Assessment: Pt reports no increased pain during or post tx only fatigued  Plan: Continue quad strengthening

## 2018-06-12 ENCOUNTER — APPOINTMENT (OUTPATIENT)
Dept: PHYSICAL THERAPY | Facility: REHABILITATION | Age: 58
End: 2018-06-12
Payer: COMMERCIAL

## 2018-06-12 ENCOUNTER — EVALUATION (OUTPATIENT)
Dept: PHYSICAL THERAPY | Facility: REHABILITATION | Age: 58
End: 2018-06-12
Payer: COMMERCIAL

## 2018-06-12 DIAGNOSIS — Z98.890 S/P LATERAL MENISCUS REPAIR OF RIGHT KNEE: Primary | ICD-10-CM

## 2018-06-12 PROCEDURE — 97110 THERAPEUTIC EXERCISES: CPT | Performed by: PHYSICAL THERAPIST

## 2018-06-12 PROCEDURE — G8991 OTHER PT/OT GOAL STATUS: HCPCS | Performed by: PHYSICAL THERAPIST

## 2018-06-12 PROCEDURE — G8992 OTHER PT/OT  D/C STATUS: HCPCS | Performed by: PHYSICAL THERAPIST

## 2018-06-12 PROCEDURE — 97530 THERAPEUTIC ACTIVITIES: CPT | Performed by: PHYSICAL THERAPIST

## 2018-06-12 PROCEDURE — 97112 NEUROMUSCULAR REEDUCATION: CPT | Performed by: PHYSICAL THERAPIST

## 2018-06-12 NOTE — PROGRESS NOTES
Discharge    Today's date: 2018  Patient name: Katie Dennis  : 1960  MRN: [id number]  Referring provider: Napoleon Newton MD  Dx:   Encounter Diagnosis     ICD-10-CM    1  S/P lateral meniscus repair of right knee Z98 890                   Subjective: Patient reports that his knee has been doing well  He reports that his is compliant with HEP and has been feeling really good  He reports no limitations with riding his bike  1:1 w/ PT/PTA  Pain  Current pain ratin  At best pain ratin  At worst pain ratin  Quality: dull ache  Aggravating factors: overuse       Objective: See treatment diary below    GAIT: normal gait pattern  Squat assess: normal squat assess  ¼ squat assess: normal LLE  SLS: 10 sec  Each leg               MMT     Hip       L       R   Flex  5 5   Extn  5 5   Abd  5 5   Add  5 5   IR  5 5   ER  5 5                  MMT          AROM          PROM     Knee         L        R        L         R         L          R   Flex  5 4 WNL WNL WNL WNL   Extn  5 4- WNL WNL WNL WNL                                   MMT     Ankle       L        R   PF 5 5   DF  5 5   EHL 5 5         Straight leg raise:   L= -   R= -     Knee:  normal joint mobility      Precautions: back pain, recent surgery     Daily Treatment Diary      Manual  5/3  5/7  5/10  5/14  5/17  5/23  5/25         Quad stretch, jose position 10x10"  10x10"  NP  10x10"  10x10"             Dressing change FB                      patellar mob             FB                                                               Exercise Diary      HEP - Prone quad stretch, Seated calf stretch, seated heel slide, calf raise, LAQ AROM, quad set, clam               LAQ ROM  np  2x10, 3", 5#  2x10  3" 5#  PTB  5" 3x10        recumbent bike  10'  10'  8'  10' 5' 10' 5'    SLR flexion  2x10  2x10, 5"  5"  2x10  Long sit 5"  3x10  5" 3x10  2# 5" 3x10     total gym lvl 6 (0-60 deg  )NO MORE THAN 60* FLEX double leg  8'  lvl 9, 3x10  Lvl 9  3x10  Lvl 9  3x10  3x10  np   total gym lvl 6 (0-60 deg ) single leg NO MORE THAN 60* FLEX 2x10 lvl 9, 3x10 PT assist Lvl 9  3x10 Lvl 9  3x10 3x10  np   Quad sets    5# 5"  2x10 5# 5"  3x10  np    quad set NMES 10 on/25 off- 10'  FB  np  np      np   Lat step down green np np 3x10 3x10 3x10 3x10    Hydro          ----walk  fwd 10' 10' 10' 3 0  5'      --- jogging    5' 4 0  7'      Side shuffle    1 5 3' ea      --walk 1 0 rev 2'         SLS ball toss   2x15    2x15   Treadmill jogging     4 0mph 5' 4 5mph  5' 7', 5 0 mph   squats     3x10  1x10 (for form)   TB side stepping      OTB fatigue OTB fatigue   BOSU step overs      2x10 2x15   BOSU squats      3" 2x10 3", 2x10   Leg press       2x12 @ 145#   lunge       2x10         Modalities                                                                                                 Short Term:  1  Pt will report decreased levels of pain by at least 2 subjective ratings in 4 weeks  met  2  Pt will demonstrate improved ROM by at least 10 degrees in 4 weeks  met  3  Pt will demonstrate improved strength by 1/2 grade  met  4  Pt will be able to ascend/descend steps in 4 weeks  met  Long Term:   1  Pt will be independent in their HEP in 6 weeks  met  2  Pt will be be pain free with IADL's  met  3  Pt will demonstrate improved FOTO, > 25  Met   4  Pt will be able to return to jogging in 6 weeks  met     Assessment: Pt has made great progress with therapy  No symptom provocation with treatment today  Full strength and ROM present at this time as well as good adherence to HEP  Patient has met all goals and is ready for discharge at this time    Plan: Discharge to HEP  Patient to contact clinic via phone PRN

## 2018-06-14 ENCOUNTER — APPOINTMENT (OUTPATIENT)
Dept: PHYSICAL THERAPY | Facility: REHABILITATION | Age: 58
End: 2018-06-14
Payer: COMMERCIAL

## 2018-10-09 ENCOUNTER — EVALUATION (OUTPATIENT)
Dept: PHYSICAL THERAPY | Facility: REHABILITATION | Age: 58
End: 2018-10-09
Payer: COMMERCIAL

## 2018-10-09 DIAGNOSIS — M25.561 ACUTE PAIN OF RIGHT KNEE: Primary | ICD-10-CM

## 2018-10-09 PROCEDURE — 97161 PT EVAL LOW COMPLEX 20 MIN: CPT | Performed by: PHYSICAL THERAPIST

## 2018-10-09 PROCEDURE — G8991 OTHER PT/OT GOAL STATUS: HCPCS | Performed by: PHYSICAL THERAPIST

## 2018-10-09 PROCEDURE — 97110 THERAPEUTIC EXERCISES: CPT | Performed by: PHYSICAL THERAPIST

## 2018-10-09 PROCEDURE — G8990 OTHER PT/OT CURRENT STATUS: HCPCS | Performed by: PHYSICAL THERAPIST

## 2018-10-09 PROCEDURE — 97112 NEUROMUSCULAR REEDUCATION: CPT | Performed by: PHYSICAL THERAPIST

## 2018-10-09 NOTE — PROGRESS NOTES
PT Evaluation     Today's date: 10/9/2018  Patient name: Kin Sanchez  : 1960  MRN: [id number]  Referring provider: Valentin Chiu MD  Dx:   Encounter Diagnosis     ICD-10-CM    1  Acute pain of right knee [id number]        Start Time: 1700  Stop Time: 1800  Total time in clinic (min): 60 minutes    Assessment  Impairments: abnormal muscle firing, abnormal muscle tone, abnormal or restricted ROM, abnormal movement, impaired physical strength, lacks appropriate home exercise program, pain with function and weight-bearing intolerance    Assessment details: Kin Sanchez is a 56 y o  male who presents with complaints of Acute pain of right knee  (primary encounter diagnosis)  Patient is presenting with decreased quad strength and hip strength with slight swelling being present at this time  Negative meniscal special testing today  Patient reports limitations with pushing off foot as well as with increased weight-bearing through leg only  Prognosis is good given HEP compliance and PT 2-3x/wk  Positive prognostic indicators include positive attitude toward recovery  Please contact me if you have any questions or recommendations  Thank you for the opportunity to share in  [facility]       Understanding of Dx/Px/POC: good   Prognosis: good    Plan  Patient would benefit from: skilled PT  Planned modality interventions: thermotherapy: hydrocollator packs and cryotherapy  Planned therapy interventions: joint mobilization, manual therapy, neuromuscular re-education, strengthening, stretching, therapeutic activities, therapeutic exercise, therapeutic training, graded exercise, home exercise program, functional ROM exercises, flexibility, balance, balance/weight bearing training and Tolliver taping  Frequency: 2x week  Duration in weeks: 6  Plan of Care beginning date: 10/9/2018  Plan of Care expiration date: 2018  Treatment plan discussed with: patient        Subjective Evaluation    History of Present Illness  Mechanism of injury: Patient reports that he had discomfort starting again in his right knee after performing leg press and leg extension exercise in August  Had steroid injection and medication which has helped with the swelling  Patient reports no locking but does have clicking (not painful)  Patient reports needed to use left foot first when using a ladder  Perla Blend He reports having trouble pushing off on his leg  No trouble with stair navigation  Not a recurrent problem   Quality of life: good    Pain  Current pain ratin  At best pain ratin  At worst pain rating: 3  Quality: dull ache  Aggravating factors: standing, stair climbing, walking and running    Social Support  Steps to enter house: yes  Stairs in house: yes   1    Patient Goals  Patient goals for therapy: increased motion, increased strength and return to sport/leisure activities  Patient goal: increase pain        Objective    GAIT: normal gait pattern  Squat assess: 50% depth, right knee pain  ¼ squat assess: slight knee pain with RLE  SLS: > 10 sec  each           MMT    Hip       L       R   Flex  5 4+   Extn  5 4+   Abd  5 4   Add  5 4   IR  5 5   ER  5 5                 MMT         AROM         PROM    Knee         L        R        L         R         L          R   Flex  5 5 115 114 120 120   Extn   5 4 WNL WNL WNL WNL                         MMT    Ankle       L        R   PF 5 4+   DF  5 5   EHL 5 5     Palpation: no TTP  Straight leg raise:   L= -   R= -    Knee: post drawer = -  Lachmans test= -   anterior draw test= -    Valgus stress test= -  varus stress test =  -  Beto test  =  -  Patella grind test=  -   Patella mobility test=  -  [place]s test=  - [place]ssaly= -  apprehension test= -   joint findings= lateral     Decreased quad tone RLE    Precautions: back pain, prior right meniscal surgery    Daily Treatment Diary     Manual  10/9            Medial patellar taping FB Exercise Diary  10/9            Quad set 2x10, 5"            bridging 2x10, 5"            SLR flexion 2x5, 5"            Total gym  Lvl 6, 6'            Recumbent bike 8', lvl 2                                                                                                                                     Modalities                                                         Short Term:  1  Pt will report decreased levels of pain by at least 2 subjective ratings in 4 weeks  2  Pt will demonstrate improved strength by 1/2 grade in 4 weeks  3  Pt will be able to descend steps without pain in 4 weeks  Long Term:   1  Pt will be independent in their HEP in 6 weeks  2  Pt will be be pain free with IADL's in 6 weeks  3  Pt will demonstrate improved FOTO, > 17    4  Pt will be able to return to full work duties in 6 weeks

## 2018-10-09 NOTE — LETTER
2018    MD Gema Reaves [address] [facility]    Patient: Niya Lieberman   YOB: 1960   Date of Visit: 10/9/2018     Encounter Diagnosis     ICD-10-CM    1  Acute pain of right knee M25 561        Dear Dr Moore Filter:    Please review the attached Plan of Care from [facility]  recent visit  Please verify that you agree therapy should continue by signing the attached document and sending it back to our office  If you have any questions or concerns, please don't hesitate to call  Sincerely,    Oscar Gamble, PT      Referring Provider:      I certify that I have read the below Plan of Care and certify the need for these services furnished under this plan of treatment while under my care  MD Gema Reaves [address]: [phone]          PT Evaluation     Today's date: 10/9/2018  Patient name: Niya Lieberman  : 1960  MRN: [id number]  Referring provider: Rose Marie Reyes MD  Dx:   Encounter Diagnosis     ICD-10-CM    1  Acute pain of right knee M25 561        Start Time: 1700  Stop Time: 1800  Total time in clinic (min): 60 minutes    Assessment  Impairments: abnormal muscle firing, abnormal muscle tone, abnormal or restricted ROM, abnormal movement, impaired physical strength, lacks appropriate home exercise program, pain with function and weight-bearing intolerance    Assessment details: Niya Lieberman is a 56 y o  male who presents with complaints of Acute pain of right knee  (primary encounter diagnosis)  Patient is presenting with decreased quad strength and hip strength with slight swelling being present at this time  Negative meniscal special testing today  Patient reports limitations with pushing off foot as well as with increased weight-bearing through leg only  Prognosis is good given HEP compliance and PT 2-3x/wk    Positive prognostic indicators include positive attitude toward recovery  Please contact me if you have any questions or recommendations  Thank you for the opportunity to share in  [facility]  Understanding of Dx/Px/POC: good   Prognosis: good    Plan  Patient would benefit from: skilled PT  Planned modality interventions: thermotherapy: hydrocollator packs and cryotherapy  Planned therapy interventions: joint mobilization, manual therapy, neuromuscular re-education, strengthening, stretching, therapeutic activities, therapeutic exercise, therapeutic training, graded exercise, home exercise program, functional ROM exercises, flexibility, balance, balance/weight bearing training and Tolliver taping  Frequency: 2x week  Duration in weeks: 6  Plan of Care beginning date: 10/9/2018  Plan of Care expiration date: 2018  Treatment plan discussed with: patient        Subjective Evaluation    History of Present Illness  Mechanism of injury: Patient reports that he had discomfort starting again in his right knee after performing leg press and leg extension exercise in August  Had steroid injection and medication which has helped with the swelling  Patient reports no locking but does have clicking (not painful)  Patient reports needed to use left foot first when using a ladder  [facility] He reports having trouble pushing off on his leg  No trouble with stair navigation  Not a recurrent problem   Quality of life: good    Pain  Current pain ratin  At best pain ratin  At worst pain rating: 3  Quality: dull ache  Aggravating factors: standing, stair climbing, walking and running    Social Support  Steps to enter house: yes  Stairs in house: yes   1    Patient Goals  Patient goals for therapy: increased motion, increased strength and return to sport/leisure activities  Patient goal: increase pain        Objective    GAIT: normal gait pattern  Squat assess: 50% depth, right knee pain  ¼ squat assess: slight knee pain with RLE  SLS: > 10 sec  each           MMT    Hip       L       R   Flex  5 4+   Extn  5 4+   Abd  5 4   Add  5 4   IR  5 5   ER  5 5                 MMT         AROM         PROM    Knee         L        R        L         R         L          R   Flex  5 5 115 114 120 120   Extn  5 4 WNL WNL WNL WNL                         MMT    Ankle       L        R   PF 5 4+   DF  5 5   EHL 5 5     Palpation: no TTP  Straight leg raise:   L= -   R= -    Knee: post drawer = -  Lachmans test= -   anterior draw test= -    Valgus stress test= -  varus stress test =  -  Beto test  =  -  Patella grind test=  -   Patella mobility test=  -  Pierces test=  - Thessaly= -  apprehension test= -   joint findings= lateral     Decreased quad tone RLE    Precautions: back pain, prior right meniscal surgery    Daily Treatment Diary     Manual  10/9            Medial patellar taping FB                                                                    Exercise Diary  10/9            Quad set 2x10, 5"            bridging 2x10, 5"            SLR flexion 2x5, 5"            Total gym  Lvl 6, 6'            Recumbent bike 8', lvl 2                                                                                                                                     Modalities                                                         Short Term:  1  Pt will report decreased levels of pain by at least 2 subjective ratings in 4 weeks  2  Pt will demonstrate improved strength by 1/2 grade in 4 weeks  3  Pt will be able to descend steps without pain in 4 weeks  Long Term:   1  Pt will be independent in their HEP in 6 weeks  2  Pt will be be pain free with IADL's in 6 weeks  3  Pt will demonstrate improved FOTO, > 17    4  Pt will be able to return to full work duties in 6 weeks

## 2018-10-11 ENCOUNTER — OFFICE VISIT (OUTPATIENT)
Dept: PHYSICAL THERAPY | Facility: REHABILITATION | Age: 58
End: 2018-10-11
Payer: COMMERCIAL

## 2018-10-11 ENCOUNTER — TRANSCRIBE ORDERS (OUTPATIENT)
Dept: PHYSICAL THERAPY | Facility: REHABILITATION | Age: 58
End: 2018-10-11

## 2018-10-11 DIAGNOSIS — G89.29 CHRONIC PAIN OF RIGHT KNEE: Primary | ICD-10-CM

## 2018-10-11 DIAGNOSIS — M25.561 CHRONIC PAIN OF RIGHT KNEE: Primary | ICD-10-CM

## 2018-10-11 DIAGNOSIS — M25.561 ACUTE PAIN OF RIGHT KNEE: Primary | ICD-10-CM

## 2018-10-11 PROCEDURE — 97112 NEUROMUSCULAR REEDUCATION: CPT | Performed by: PHYSICAL THERAPIST

## 2018-10-11 PROCEDURE — 97110 THERAPEUTIC EXERCISES: CPT | Performed by: PHYSICAL THERAPIST

## 2018-10-11 NOTE — PROGRESS NOTES
Daily Note     Today's date: 10/11/2018  Patient name: Dioni Norris  : 1960  MRN: [id number]  Referring provider: Chary Guillen MD  Dx:   Encounter Diagnosis     ICD-10-CM    1  Acute pain of right knee M25 561        Start Time: 830  Stop Time: 925  Total time in clinic (min): 55 minutes    Subjective: Patient reports that his knee is doing okay as it is early in the morning  1 on 1 with PT/PTA  Objective: See treatment diary below  Precautions: back pain, prior right meniscal surgery     Daily Treatment Diary      Manual  10/9  10/11                   Medial patellar taping FB                                                                                                                           Exercise Diary  10/9  10/11                   Quad set 2x10, 5"  HEP                   Bridging- S/L 2x10, 5"  2x10, 5"                   SLR flexion 2x5, 5"  2x5, 5"                   Total gym  Lvl 6, 6'  5', lvl 6                   Recumbent bike 8', lvl 2  10', lvl 2                    stand hip abd    3x10 OTB                    SL ball toss- 2 direction   2x20 ea                     step up blue   3x15                   BOSU mini squat   2x10, 5"                                                                                                                                                  Assessment: Tolerated treatment well  Patient reported fatigue post treatment today with quad based strengthening  Plan: Continue per plan of care

## 2018-10-17 ENCOUNTER — OFFICE VISIT (OUTPATIENT)
Dept: PHYSICAL THERAPY | Facility: REHABILITATION | Age: 58
End: 2018-10-17
Payer: COMMERCIAL

## 2018-10-17 DIAGNOSIS — M25.561 ACUTE PAIN OF RIGHT KNEE: Primary | ICD-10-CM

## 2018-10-17 PROCEDURE — 97110 THERAPEUTIC EXERCISES: CPT | Performed by: PHYSICAL THERAPIST

## 2018-10-17 PROCEDURE — 97112 NEUROMUSCULAR REEDUCATION: CPT | Performed by: PHYSICAL THERAPIST

## 2018-10-17 NOTE — PROGRESS NOTES
Daily Note     Today's date: 10/17/2018  Patient name: Kin Sanchez  : 1960  MRN: [id number]  Referring provider: Valentin Chiu MD  Dx:   Encounter Diagnosis     ICD-10-CM    1  Acute pain of right knee M25 561                   Subjective: Reports no improvement from last session      Objective: See treatment diary below    Precautions: back pain, prior right meniscal surgery     Daily Treatment Diary      Manual  10/9  10/11                   Medial patellar taping FB                                                                                                                           Exercise Diary  10/9  10/11  10/17                 Bridging- S/L 2x10, 5"  2x10, 5"  2x10 5"                 SLR flexion 2x5, 5"  2x5, 5"  2x10 5"                 Total gym  Lvl 6, 6'  5', lvl 6  5' lvl 6                 Recumbent bike 8', lvl 2  10', lvl 2  10'                  stand hip abd    3x10 OTB  3x10 OTB B/L                   SL ball toss- 2 direction   2x20 ea   2x20 ea                  step up blue   3x15  20x ea fsu/lsu                  BOSU mini squat   2x10, 5"  2x10 5"                                                                                                                                                  Assessment: Tolerated treatment well  Patient would benefit from continued PT, no complaints of pain or fatigue throughout session, reported feeling his "hip working" during standing hip abduction  Plan: Continue per plan of care

## 2018-10-18 ENCOUNTER — OFFICE VISIT (OUTPATIENT)
Dept: PHYSICAL THERAPY | Facility: REHABILITATION | Age: 58
End: 2018-10-18
Payer: COMMERCIAL

## 2018-10-18 DIAGNOSIS — M25.561 ACUTE PAIN OF RIGHT KNEE: Primary | ICD-10-CM

## 2018-10-18 PROCEDURE — 97530 THERAPEUTIC ACTIVITIES: CPT

## 2018-10-18 PROCEDURE — 97112 NEUROMUSCULAR REEDUCATION: CPT

## 2018-10-18 PROCEDURE — 97110 THERAPEUTIC EXERCISES: CPT

## 2018-10-18 NOTE — PROGRESS NOTES
Daily Note     Today's date: 10/18/2018  Patient name: Sharman Fabry  : 1960  MRN: [id number]  Referring provider: Demetris Randle MD  Dx:   Encounter Diagnosis     ICD-10-CM    1  Acute pain of right knee M25 561                   Subjective: Reports his knee is sore and swollen  1:1 w/ PTA [id number], indep TE remaining      Objective: See treatment diary below    Precautions: back pain, prior right meniscal surgery     Daily Treatment Diary      Manual  10/9  10/18                   Medial patellar taping FB                      prone quad stretch   HS                                                                                                 Exercise Diary  10/9  10/11  10/17  10/18               Bridging- S/L 2x10, 5"  2x10, 5"  2x10 5"  5"2x10               SLR flexion 2x5, 5"  2x5, 5"  2x10 5"  5" 2x10               Total gym  Lvl 6, 6'  5', lvl 6  5' lvl 6  Lvl 6 5'               Recumbent bike 8', lvl 2  10', lvl 2  10'  10'                 stand hip abd    3x10 OTB  3x10 OTB B/L   3x10   OTB  B/L                SL ball toss- 2 direction   2x20 ea   2x20 ea  2x20 ea                step up blue   3x15  20x ea fsu/lsu   20x ea               BOSU mini squat   2x10, 5"  2x10 5"  5" 2x10                prone SLR       3" 2x10                                                                                                Assessment: Pt reports quad tightness with prone stretching, no increased pain  Plan: Continue per plan of care

## 2018-10-23 ENCOUNTER — OFFICE VISIT (OUTPATIENT)
Dept: PHYSICAL THERAPY | Facility: REHABILITATION | Age: 58
End: 2018-10-23
Payer: COMMERCIAL

## 2018-10-23 DIAGNOSIS — M25.561 ACUTE PAIN OF RIGHT KNEE: Primary | ICD-10-CM

## 2018-10-23 PROCEDURE — 97110 THERAPEUTIC EXERCISES: CPT | Performed by: PHYSICAL MEDICINE & REHABILITATION

## 2018-10-23 PROCEDURE — 97112 NEUROMUSCULAR REEDUCATION: CPT | Performed by: PHYSICAL MEDICINE & REHABILITATION

## 2018-10-23 NOTE — PROGRESS NOTES
Daily Note     Today's date: 10/23/2018  Patient name: Nadege Reed  : 1960  MRN: [id number]  Referring provider: Olga Conley MD  Dx:   Encounter Diagnosis     ICD-10-CM    1  Acute pain of right knee M25 561                   Subjective: Patient presents with reports of improved motion today, notes the right knee was drained yesterday  MRI results show no new damage  Objective: See treatment diary below    Precautions: back pain, prior right meniscal surgery     Daily Treatment Diary      Manual  10/9  10/18 10/23                  Medial patellar taping FB                      prone quad stretch   HS  LH                                                                                               Exercise Diary  10/9  10/11  10/17  10/18  10/23             Bridging- S/L 2x10, 5"  2x10, 5"  2x10 5"  5"2x10  2x10, 5"             SLR flexion 2x5, 5"  2x5, 5"  2x10 5"  5" 2x10  2x10, 5"             Total gym  Lvl 6, 6'  5', lvl 6  5' lvl 6  Lvl 6 5'  L6, 5'             Recumbent bike 8', lvl 2  10', lvl 2  10'  10'   10'              stand hip abd    3x10 OTB  3x10 OTB B/L   3x10   OTB  B/L  OTB, 3x10 ea              SL ball toss- 2 direction   2x20 ea   2x20 ea  2x20 ea  2x20 ea              step up blue   3x15  20x ea fsu/lsu   20x ea  20x ea             BOSU mini squat   2x10, 5"  2x10 5"  5" 2x10  2x10, 5"              prone SLR       3" 2x10  2x10, 5"                                                                                              Assessment: Patient demonstrates good tolerance to treatment overall, denies pain increase with TE as charted  Plan: Continue per plan of care

## 2018-10-25 ENCOUNTER — OFFICE VISIT (OUTPATIENT)
Dept: PHYSICAL THERAPY | Facility: REHABILITATION | Age: 58
End: 2018-10-25
Payer: COMMERCIAL

## 2018-10-25 DIAGNOSIS — M25.561 ACUTE PAIN OF RIGHT KNEE: Primary | ICD-10-CM

## 2018-10-25 PROCEDURE — 97112 NEUROMUSCULAR REEDUCATION: CPT

## 2018-10-25 PROCEDURE — 97110 THERAPEUTIC EXERCISES: CPT

## 2018-10-25 NOTE — PROGRESS NOTES
Daily Note     Today's date: 10/25/2018  Patient name: Brenda Chen  : 1960  MRN: [id number]  Referring provider: Jennyfer Dill MD  Dx:   Encounter Diagnosis     ICD-10-CM    1  Acute pain of right knee M25 561                   Subjective: Patient presents today being very swollen with a "bubble' on the lateral aspect of his knee surrounding previous drainage site  Objective: See treatment diary below    Precautions: back pain, prior right meniscal surgery     Daily Treatment Diary      Manual  10/9  10/18 10/23   10/25               Medial patellar taping FB                      prone quad stretch   HS  LH                  KT taping for swelling        HS                                                                     Exercise Diary  10/9  10/11  10/17  10/18  10/23  10/25           Bridging- S/L 2x10, 5"  2x10, 5"  2x10 5"  5"2x10  2x10, 5"             SLR flexion 2x5, 5"  2x5, 5"  2x10 5"  5" 2x10  2x10, 5"  5' 2x10           Total gym  Lvl 6, 6'  5', lvl 6  5' lvl 6  Lvl 6 5'  L6, 5'  Lvl 3 8'           Recumbent bike 8', lvl 2  10', lvl 2  10'  10'   10'  10'             stand hip abd    3x10 OTB  3x10 OTB B/L   3x10   OTB  B/L  OTB, 3x10 ea              SL ball toss- 2 direction   2x20 ea   2x20 ea  2x20 ea  2x20 ea              step up blue   3x15  20x ea fsu/lsu   20x ea  20x ea             BOSU mini squat   2x10, 5"  2x10 5"  5" 2x10  2x10, 5"              prone SLR       3" 2x10  2x10, 5"  5" 2x10            prone quad sets           5" 2x10                                                                    Assessment: Patient reports no increased pain during or post tx  Plan: Continue per plan of care

## 2018-10-30 ENCOUNTER — OFFICE VISIT (OUTPATIENT)
Dept: PHYSICAL THERAPY | Facility: REHABILITATION | Age: 58
End: 2018-10-30
Payer: COMMERCIAL

## 2018-10-30 DIAGNOSIS — M25.561 ACUTE PAIN OF RIGHT KNEE: Primary | ICD-10-CM

## 2018-10-30 PROCEDURE — 97530 THERAPEUTIC ACTIVITIES: CPT

## 2018-10-30 PROCEDURE — 97110 THERAPEUTIC EXERCISES: CPT

## 2018-10-30 PROCEDURE — 97112 NEUROMUSCULAR REEDUCATION: CPT

## 2018-10-30 NOTE — PROGRESS NOTES
Daily Note     Today's date: 10/30/2018  Patient name: Cordell Jenkins  : 1960  MRN: [id number]  Referring provider: Freddy Thomas MD  Dx:   Encounter Diagnosis     ICD-10-CM    1  Acute pain of right knee M25 561                   Subjective: Patient reports his knee is stiff in the morning and has medial knee pain with certain movements  Objective: See treatment diary below    Precautions: back pain, prior right meniscal surgery     Daily Treatment Diary      Manual  10/9  10/18 10/23   10/25  10/30             Medial patellar taping FB                      prone quad stretch   HS  LH                  KT taping for swelling        HS                IASTM med/lat knee         HS                                           Exercise Diary  10/9  10/11  10/17  10/18  10/23  10/25  10/30         Bridging- S/L 2x10, 5"  2x10, 5"  2x10 5"  5"2x10  2x10, 5"    NP         SLR flexion 2x5, 5"  2x5, 5"  2x10 5"  5" 2x10  2x10, 5"  5' 2x10  NP         Total gym  Lvl 6, 6'  5', lvl 6  5' lvl 6  Lvl 6 5'  L6, 5'  Lvl 3 8'  NP         Recumbent bike 8', lvl 2  10', lvl 2  10'  10'   10'  10'   10'          stand hip abd    3x10 OTB  3x10 OTB B/L   3x10   OTB  B/L  OTB, 3x10 ea    NP          SL ball toss- 2 direction   2x20 ea   2x20 ea  2x20 ea  2x20 ea    NP          step up blue   3x15  20x ea fsu/lsu   20x ea  20x ea    NP         BOSU mini squat   2x10, 5"  2x10 5"  5" 2x10  2x10, 5"    5" 2x15          prone SLR       3" 2x10  2x10, 5"  5" 2x10  NP          prone quad sets           5" 2x10  NP          storks GTB 4-way             2x15          rockerboard             2x15         Leg press       #55 x10  #85 2x10      Deadifts       2x15               Assessment: Patient is fatigued post tx, however reports no changes in sx throughout session  Plan: Continue per plan of care

## 2018-11-01 ENCOUNTER — EVALUATION (OUTPATIENT)
Dept: PHYSICAL THERAPY | Facility: REHABILITATION | Age: 58
End: 2018-11-01
Payer: COMMERCIAL

## 2018-11-01 DIAGNOSIS — M25.561 ACUTE PAIN OF RIGHT KNEE: Primary | ICD-10-CM

## 2018-11-01 PROCEDURE — 97110 THERAPEUTIC EXERCISES: CPT

## 2018-11-01 PROCEDURE — G8990 OTHER PT/OT CURRENT STATUS: HCPCS

## 2018-11-01 PROCEDURE — 97112 NEUROMUSCULAR REEDUCATION: CPT

## 2018-11-01 PROCEDURE — G8991 OTHER PT/OT GOAL STATUS: HCPCS

## 2018-11-01 NOTE — PROGRESS NOTES
Daily Note/Re-evaluation     Today's date: 2018  Patient name: Juve Cheung  : 1960  MRN: [id number]  Referring provider: Ángel Be MD  Dx:   Encounter Diagnosis     ICD-10-CM    1  Acute pain of right knee M25 561                 Re-evaluation performed by Shelbi Breaux PT, DPT, FAAOMPT    Short Term:  1  Pt will report decreased levels of pain by at least 2 subjective ratings in 4 weeks  Partially met  2  Pt will demonstrate improved strength by 1/2 grade in 4 weeks  Met  3  Pt will be able to descend steps without pain in 4 weeks  50% met  Long Term:   1  Pt will be independent in their HEP in 6 weeks  met  2  Pt will be be pain free with IADL's in 6 weeks  Not met  3  Pt will demonstrate improved FOTO, > 17  Not met  4  Pt will be able to return to full work duties in 6 weeks  Not met (unable to run)    Assessment: Patient reports lateral step down being challenging, fatigues quickly  Overall slow gains being reported at this time  Full knee ROM present at this time however patient continues to present with leg weakness  Plan: 4 weeks, 2 times per week  Subjective: Patient offers no new complaints at this time  He reports that his knee still bothers him with twisting and it feels "weak"  Pain  Current pain ratin  At best pain ratin  At worst pain rating: 3  Quality: dull ache  Aggravating factors: standing, stair climbing, walking and running     Social Support  Steps to enter house: yes  Stairs in house: yes   1     Patient Goals  Patient goals for therapy: increased motion, increased strength and return to sport/leisure activities  Patient goal: decrease pain        Objective     GAIT: normal gait pattern  Squat assess: 50% depth, right knee pain  ¼ squat assess: slight knee pain with RLE  SLS: > 10 sec  each               MMT     Hip       L       R   Flex  5 5   Extn  5 4+   Abd  5 5-   Add  5 4   IR  5 5   ER   5 5                  MMT          AROM          PROM   Knee         L        R        L         R         L          R   Flex  5 5 115 114 120 120   Extn  5 4+ WNL WNL WNL WNL                                   MMT     Ankle       L        R   PF 5 5   DF   5 5   EHL 5 5      Palpation: no TTP  Straight leg raise:   L= -   R= -     Knee: post drawer = -  Lachmans test= -   anterior draw test= -    Valgus stress test= -  varus stress test =  -  Beto test  =  -  Patella grind test=  -   Patella mobility test=  -  Zekes test=  - Thessaly= -  apprehension test= -   joint findings= lateral      Swelling lateral patellar region    Objective: See treatment diary below    Precautions: back pain, prior right meniscal surgery     Daily Treatment Diary      Manual  10/9  10/18 10/23   10/25  10/30             Medial patellar taping FB                      prone quad stretch   HS  LH                  KT taping for swelling        HS                IASTM med/lat knee         HS                                           Exercise Diary  10/9  10/11  10/17  10/18  10/23  10/25  10/30  11/1       Bridging- S/L 2x10, 5"  2x10, 5"  2x10 5"  5"2x10  2x10, 5"    NP         SLR flexion 2x5, 5"  2x5, 5"  2x10 5"  5" 2x10  2x10, 5"  5' 2x10  NP         Total gym  Lvl 6, 6'  5', lvl 6  5' lvl 6  Lvl 6 5'  L6, 5'  Lvl 3 8'  NP         Recumbent bike 8', lvl 2  10', lvl 2  10'  10'   10'  10'   10'  10'        stand hip abd    3x10 OTB  3x10 OTB B/L   3x10   OTB  B/L  OTB, 3x10 ea    NP          SL ball toss- 2 direction   2x20 ea   2x20 ea  2x20 ea  2x20 ea    NP          step up blue   3x15  20x ea fsu/lsu   20x ea  20x ea    NP         BOSU mini squat   2x10, 5"  2x10 5"  5" 2x10  2x10, 5"    5" 2x15  5" 2x15        prone SLR       3" 2x10  2x10, 5"  5" 2x10  NP          prone quad sets           5" 2x10  NP          storks GTB 4-way             2x15          rockerboard             2x15  2x20       Leg press       #55 x10  #85 2x10 3x15 #85-#130     Deadifts       2x15      SL toe tap 2x10     Lat step down from foam        3x20     Wall squats w/ pball        2x20

## 2018-11-07 ENCOUNTER — OFFICE VISIT (OUTPATIENT)
Dept: PHYSICAL THERAPY | Facility: REHABILITATION | Age: 58
End: 2018-11-07
Payer: COMMERCIAL

## 2018-11-07 DIAGNOSIS — M25.561 ACUTE PAIN OF RIGHT KNEE: Primary | ICD-10-CM

## 2018-11-07 PROCEDURE — 97112 NEUROMUSCULAR REEDUCATION: CPT

## 2018-11-07 PROCEDURE — 97530 THERAPEUTIC ACTIVITIES: CPT

## 2018-11-07 PROCEDURE — 97110 THERAPEUTIC EXERCISES: CPT

## 2018-11-07 NOTE — PROGRESS NOTES
Daily Note     Today's date: 2018  Patient name: Tracy Fernandez  : 1960  MRN: [id number]  Referring provider: Anson Helm MD  Dx:   Encounter Diagnosis     ICD-10-CM    1  Acute pain of right knee M25 561                   Subjective: Patient reports that his knee feels as if it's not getting any better         Objective: See treatment diary below  Precautions: back pain, prior right meniscal surgery     Daily Treatment Diary      Manual  10/9  10/18 10/23   10/25  10/30             Medial patellar taping FB                      prone quad stretch   HS [facility]                  KT taping for swelling        HS                IASTM med/lat knee         HS                                           Exercise Diary  10/9  10/11  10/17  10/18  10/23  10/25  10/30  11/1  11/7     Bridging- S/L 2x10, 5"  2x10, 5"  2x10 5"  5"2x10  2x10, 5"    NP         SLR flexion 2x5, 5"  2x5, 5"  2x10 5"  5" 2x10  2x10, 5"  5' 2x10  NP         Total gym  Lvl 6, 6'  5', lvl 6  5' lvl 6  Lvl 6 5'  L6, 5'  Lvl 3 8'  NP    Lvl 6    8'     Recumbent bike 8', lvl 2  10', lvl 2  10'  10'   10'  10'   10'  10'  10'      stand hip abd    3x10 OTB  3x10 OTB B/L   3x10   OTB  B/L  OTB, 3x10 ea    NP          SL ball toss- 2 direction   2x20 ea   2x20 ea  2x20 ea  2x20 ea    NP          step up blue   3x15  20x ea fsu/lsu   20x ea  20x ea    NP         BOSU mini squat   2x10, 5"  2x10 5"  5" 2x10  2x10, 5"    5" 2x15  5" 2x15        prone SLR       3" 2x10  2x10, 5"  5" 2x10  NP    3" 3x10      prone quad sets           5" 2x10  NP          storks GTB 4-way             2x15          rockerboard             2x15  2x20       Leg press             #55 x10  #85 2x10 3x15 #85-#130  85# 3x15  55#  3x15     Deadifts             2x15         SL toe tap               2x10       Lat step down from foam               3x20  3x20     Wall squats w/ pball               2x20       clamshells         5" 2x10    sidelying SLR w/ ER         2x10 sidelying total gym         Lvl 5  3x15             Assessment: Patient reports increased pain with lateral step down from foam        Plan: Continue per plan of care

## 2018-11-08 ENCOUNTER — HOSPITAL ENCOUNTER (EMERGENCY)
Facility: HOSPITAL | Age: 58
Discharge: ANOTHER HOSPITAL | End: 2018-11-08
Attending: EMERGENCY MEDICINE | Admitting: EMERGENCY MEDICINE

## 2018-11-08 ENCOUNTER — APPOINTMENT (OUTPATIENT)
Dept: GENERAL RADIOLOGY | Facility: HOSPITAL | Age: 58
End: 2018-11-08

## 2018-11-08 VITALS
WEIGHT: 140 LBS | DIASTOLIC BLOOD PRESSURE: 66 MMHG | HEART RATE: 121 BPM | HEIGHT: 74 IN | RESPIRATION RATE: 20 BRPM | TEMPERATURE: 94.6 F | SYSTOLIC BLOOD PRESSURE: 102 MMHG | OXYGEN SATURATION: 100 % | BODY MASS INDEX: 17.97 KG/M2

## 2018-11-08 DIAGNOSIS — I46.9 CARDIAC ARREST (HCC): Primary | ICD-10-CM

## 2018-11-08 DIAGNOSIS — E08.11 DIABETIC KETOACIDOSIS WITH COMA ASSOCIATED WITH DIABETES MELLITUS DUE TO UNDERLYING CONDITION (HCC): ICD-10-CM

## 2018-11-08 DIAGNOSIS — I21.4 NSTEMI (NON-ST ELEVATED MYOCARDIAL INFARCTION) (HCC): ICD-10-CM

## 2018-11-08 DIAGNOSIS — K92.2 UPPER GI HEMORRHAGE: ICD-10-CM

## 2018-11-08 LAB
6-ACETYL MORPHINE: NEGATIVE
A-A DO2: 179.2 MMHG (ref 0–300)
A-A DO2: 47.1 MMHG (ref 0–300)
ABO GROUP BLD: NORMAL
ABO GROUP BLD: NORMAL
ACETONE BLD QL: ABNORMAL
ALBUMIN SERPL-MCNC: 3.4 G/DL (ref 3.5–5)
ALBUMIN/GLOB SERPL: 1.6 G/DL (ref 1.5–2.5)
ALP SERPL-CCNC: 55 U/L (ref 40–129)
ALT SERPL W P-5'-P-CCNC: 49 U/L (ref 10–44)
AMPHET+METHAMPHET UR QL: NEGATIVE
ANION GAP SERPL CALCULATED.3IONS-SCNC: 24.3 MMOL/L (ref 3.6–11.2)
ANION GAP SERPL CALCULATED.3IONS-SCNC: ABNORMAL MMOL/L (ref 3.6–11.2)
APTT PPP: 33 SECONDS (ref 23.8–36.1)
ARTERIAL PATENCY WRIST A: ABNORMAL
ARTERIAL PATENCY WRIST A: POSITIVE
AST SERPL-CCNC: 69 U/L (ref 10–34)
ATMOSPHERIC PRESS: 730 MMHG
ATMOSPHERIC PRESS: 732 MMHG
BACTERIA UR QL AUTO: ABNORMAL /HPF
BARBITURATES UR QL SCN: NEGATIVE
BASE EXCESS BLDA CALC-SCNC: -16.6 MMOL/L
BASE EXCESS BLDA CALC-SCNC: -18 MMOL/L
BASOPHILS # BLD AUTO: 0.02 10*3/MM3 (ref 0–0.3)
BASOPHILS NFR BLD AUTO: 0.1 % (ref 0–2)
BDY SITE: ABNORMAL
BDY SITE: ABNORMAL
BENZODIAZ UR QL SCN: NEGATIVE
BILIRUB SERPL-MCNC: 0.7 MG/DL (ref 0.2–1.8)
BILIRUB UR QL STRIP: NEGATIVE
BLD GP AB SCN SERPL QL: NEGATIVE
BODY TEMPERATURE: 98.6 C
BODY TEMPERATURE: 98.6 C
BUN BLD-MCNC: 43 MG/DL (ref 7–21)
BUN BLD-MCNC: 48 MG/DL (ref 7–21)
BUN/CREAT SERPL: 20.9 (ref 7–25)
BUN/CREAT SERPL: 24.6 (ref 7–25)
BUPRENORPHINE SERPL-MCNC: NEGATIVE NG/ML
CALCIUM SPEC-SCNC: 7.7 MG/DL (ref 7.7–10)
CALCIUM SPEC-SCNC: 8 MG/DL (ref 7.7–10)
CANNABINOIDS SERPL QL: POSITIVE
CHLORIDE SERPL-SCNC: 96 MMOL/L (ref 99–112)
CHLORIDE SERPL-SCNC: 99 MMOL/L (ref 99–112)
CHOLEST SERPL-MCNC: 168 MG/DL (ref 0–200)
CK SERPL-CCNC: 336 U/L (ref 24–204)
CLARITY UR: ABNORMAL
CO2 SERPL-SCNC: 11.7 MMOL/L (ref 24.3–31.9)
CO2 SERPL-SCNC: <10 MMOL/L (ref 24.3–31.9)
COCAINE UR QL: NEGATIVE
COHGB MFR BLD: 0.5 % (ref 0–5)
COHGB MFR BLD: 0.6 % (ref 0–5)
COLOR UR: YELLOW
CREAT BLD-MCNC: 1.95 MG/DL (ref 0.43–1.29)
CREAT BLD-MCNC: 2.06 MG/DL (ref 0.43–1.29)
D-LACTATE SERPL-SCNC: 14.8 MMOL/L (ref 0.5–2)
DEPRECATED RDW RBC AUTO: 43.1 FL (ref 37–54)
DIGOXIN SERPL-MCNC: <0.1 NG/ML (ref 0.8–2)
EOSINOPHIL # BLD AUTO: 0.02 10*3/MM3 (ref 0–0.7)
EOSINOPHIL NFR BLD AUTO: 0.1 % (ref 0–5)
ERYTHROCYTE [DISTWIDTH] IN BLOOD BY AUTOMATED COUNT: 13.2 % (ref 11.5–14.5)
ETHANOL BLD-MCNC: <10 MG/DL
ETHANOL UR QL: <0.01 %
GFR SERPL CREATININE-BSD FRML MDRD: 34 ML/MIN/1.73
GFR SERPL CREATININE-BSD FRML MDRD: 36 ML/MIN/1.73
GLOBULIN UR ELPH-MCNC: 2.1 GM/DL
GLUCOSE BLD-MCNC: 691 MG/DL (ref 70–110)
GLUCOSE BLD-MCNC: 727 MG/DL (ref 70–110)
GLUCOSE BLDC GLUCOMTR-MCNC: 568 MG/DL (ref 70–130)
GLUCOSE UR STRIP-MCNC: ABNORMAL MG/DL
HCO3 BLDA-SCNC: 9.4 MMOL/L (ref 22–26)
HCO3 BLDA-SCNC: 9.6 MMOL/L (ref 22–26)
HCT VFR BLD AUTO: 31.7 % (ref 42–52)
HCT VFR BLD CALC: 33 % (ref 42–52)
HCT VFR BLD CALC: 36 % (ref 42–52)
HDLC SERPL-MCNC: 41 MG/DL (ref 60–100)
HGB BLD-MCNC: 10.4 G/DL (ref 14–18)
HGB BLDA-MCNC: 11.3 G/DL (ref 12–16)
HGB BLDA-MCNC: 12.1 G/DL (ref 12–16)
HGB UR QL STRIP.AUTO: ABNORMAL
HOLD SPECIMEN: NORMAL
HOROWITZ INDEX BLD+IHG-RTO: 100 %
HOROWITZ INDEX BLD+IHG-RTO: 30 %
HYALINE CASTS UR QL AUTO: ABNORMAL /LPF
IMM GRANULOCYTES # BLD: 0.22 10*3/MM3 (ref 0–0.03)
IMM GRANULOCYTES NFR BLD: 1 % (ref 0–0.5)
INR PPP: 1.34 (ref 0.9–1.1)
KETONES UR QL STRIP: ABNORMAL
LDLC SERPL CALC-MCNC: 84 MG/DL (ref 0–100)
LDLC/HDLC SERPL: 2.05 {RATIO}
LEUKOCYTE ESTERASE UR QL STRIP.AUTO: NEGATIVE
LYMPHOCYTES # BLD AUTO: 1.68 10*3/MM3 (ref 1–3)
LYMPHOCYTES NFR BLD AUTO: 7.4 % (ref 21–51)
MAGNESIUM SERPL-MCNC: 1.9 MG/DL (ref 1.7–2.6)
MCH RBC QN AUTO: 30.5 PG (ref 27–33)
MCHC RBC AUTO-ENTMCNC: 32.8 G/DL (ref 33–37)
MCV RBC AUTO: 93 FL (ref 80–94)
METHADONE UR QL SCN: NEGATIVE
METHGB BLD QL: 0.4 % (ref 0–3)
METHGB BLD QL: 0.4 % (ref 0–3)
MODALITY: ABNORMAL
MODALITY: ABNORMAL
MONOCYTES # BLD AUTO: 1.16 10*3/MM3 (ref 0.1–0.9)
MONOCYTES NFR BLD AUTO: 5.1 % (ref 0–10)
NEUTROPHILS # BLD AUTO: 19.64 10*3/MM3 (ref 1.4–6.5)
NEUTROPHILS NFR BLD AUTO: 86.3 % (ref 30–70)
NITRITE UR QL STRIP: NEGATIVE
OPIATES UR QL: NEGATIVE
OSMOLALITY SERPL CALC.SUM OF ELEC: 315.7 MOSM/KG (ref 273–305)
OSMOLALITY SERPL CALC.SUM OF ELEC: 317.6 MOSM/KG (ref 273–305)
OXYCODONE UR QL SCN: NEGATIVE
OXYHGB MFR BLDV: 96.5 % (ref 85–100)
OXYHGB MFR BLDV: 98.8 % (ref 85–100)
PCO2 BLDA: 23.6 MM HG (ref 35–45)
PCO2 BLDA: 28.9 MM HG (ref 35–45)
PCP UR QL SCN: NEGATIVE
PEEP RESPIRATORY: 5 CM[H2O]
PH BLDA: 7.14 PH UNITS (ref 7.35–7.45)
PH BLDA: 7.22 PH UNITS (ref 7.35–7.45)
PH UR STRIP.AUTO: <=5 [PH] (ref 5–8)
PHOSPHATE SERPL-MCNC: 6 MG/DL (ref 2.7–4.5)
PLATELET # BLD AUTO: 245 10*3/MM3 (ref 130–400)
PMV BLD AUTO: 10.8 FL (ref 6–10)
PO2 BLDA: 124.4 MM HG (ref 80–100)
PO2 BLDA: 480.2 MM HG (ref 80–100)
POTASSIUM BLD-SCNC: 3.1 MMOL/L (ref 3.5–5.3)
POTASSIUM BLD-SCNC: 4.1 MMOL/L (ref 3.5–5.3)
PROT SERPL-MCNC: 5.5 G/DL (ref 6–8)
PROT UR QL STRIP: NEGATIVE
PROTHROMBIN TIME: 16.9 SECONDS (ref 11–15.4)
RBC # BLD AUTO: 3.41 10*6/MM3 (ref 4.7–6.1)
RBC # UR: ABNORMAL /HPF
REF LAB TEST METHOD: ABNORMAL
RH BLD: NEGATIVE
RH BLD: NEGATIVE
SAO2 % BLDCOA: 97.4 % (ref 90–100)
SAO2 % BLDCOA: 99.8 % (ref 90–100)
SET MECH RESP RATE: 20
SODIUM BLD-SCNC: 135 MMOL/L (ref 135–153)
SODIUM BLD-SCNC: 136 MMOL/L (ref 135–153)
SP GR UR STRIP: >1.03 (ref 1–1.03)
SQUAMOUS #/AREA URNS HPF: ABNORMAL /HPF
T&S EXPIRATION DATE: NORMAL
TRIGL SERPL-MCNC: 214 MG/DL (ref 0–150)
TROPONIN I SERPL-MCNC: 11.73 NG/ML
UROBILINOGEN UR QL STRIP: ABNORMAL
VENTILATOR MODE: ABNORMAL
VLDLC SERPL-MCNC: 42.8 MG/DL
VT ON VENT VENT: 500 ML
WBC NRBC COR # BLD: 22.74 10*3/MM3 (ref 4.5–12.5)
WBC UR QL AUTO: ABNORMAL /HPF

## 2018-11-08 PROCEDURE — 86901 BLOOD TYPING SEROLOGIC RH(D): CPT

## 2018-11-08 PROCEDURE — 93005 ELECTROCARDIOGRAM TRACING: CPT | Performed by: EMERGENCY MEDICINE

## 2018-11-08 PROCEDURE — 86900 BLOOD TYPING SEROLOGIC ABO: CPT | Performed by: EMERGENCY MEDICINE

## 2018-11-08 PROCEDURE — 82805 BLOOD GASES W/O2 SATURATION: CPT | Performed by: EMERGENCY MEDICINE

## 2018-11-08 PROCEDURE — 71045 X-RAY EXAM CHEST 1 VIEW: CPT | Performed by: RADIOLOGY

## 2018-11-08 PROCEDURE — 83735 ASSAY OF MAGNESIUM: CPT | Performed by: EMERGENCY MEDICINE

## 2018-11-08 PROCEDURE — 86850 RBC ANTIBODY SCREEN: CPT | Performed by: EMERGENCY MEDICINE

## 2018-11-08 PROCEDURE — 80307 DRUG TEST PRSMV CHEM ANLYZR: CPT | Performed by: EMERGENCY MEDICINE

## 2018-11-08 PROCEDURE — 83605 ASSAY OF LACTIC ACID: CPT | Performed by: EMERGENCY MEDICINE

## 2018-11-08 PROCEDURE — 82962 GLUCOSE BLOOD TEST: CPT

## 2018-11-08 PROCEDURE — 82550 ASSAY OF CK (CPK): CPT | Performed by: EMERGENCY MEDICINE

## 2018-11-08 PROCEDURE — 84484 ASSAY OF TROPONIN QUANT: CPT | Performed by: EMERGENCY MEDICINE

## 2018-11-08 PROCEDURE — 99291 CRITICAL CARE FIRST HOUR: CPT

## 2018-11-08 PROCEDURE — 96368 THER/DIAG CONCURRENT INF: CPT

## 2018-11-08 PROCEDURE — 80162 ASSAY OF DIGOXIN TOTAL: CPT | Performed by: EMERGENCY MEDICINE

## 2018-11-08 PROCEDURE — 94799 UNLISTED PULMONARY SVC/PX: CPT

## 2018-11-08 PROCEDURE — 82375 ASSAY CARBOXYHB QUANT: CPT | Performed by: EMERGENCY MEDICINE

## 2018-11-08 PROCEDURE — 80061 LIPID PANEL: CPT | Performed by: EMERGENCY MEDICINE

## 2018-11-08 PROCEDURE — 84100 ASSAY OF PHOSPHORUS: CPT | Performed by: EMERGENCY MEDICINE

## 2018-11-08 PROCEDURE — 96365 THER/PROPH/DIAG IV INF INIT: CPT

## 2018-11-08 PROCEDURE — 85610 PROTHROMBIN TIME: CPT | Performed by: EMERGENCY MEDICINE

## 2018-11-08 PROCEDURE — 25010000002 EPINEPHRINE PF 1 MG/10ML SOLUTION PREFILLED SYRINGE: Performed by: EMERGENCY MEDICINE

## 2018-11-08 PROCEDURE — 93010 ELECTROCARDIOGRAM REPORT: CPT | Performed by: INTERNAL MEDICINE

## 2018-11-08 PROCEDURE — 81001 URINALYSIS AUTO W/SCOPE: CPT | Performed by: EMERGENCY MEDICINE

## 2018-11-08 PROCEDURE — 94003 VENT MGMT INPAT SUBQ DAY: CPT

## 2018-11-08 PROCEDURE — C1751 CATH, INF, PER/CENT/MIDLINE: HCPCS

## 2018-11-08 PROCEDURE — 80053 COMPREHEN METABOLIC PANEL: CPT | Performed by: EMERGENCY MEDICINE

## 2018-11-08 PROCEDURE — 82009 KETONE BODYS QUAL: CPT | Performed by: EMERGENCY MEDICINE

## 2018-11-08 PROCEDURE — 85730 THROMBOPLASTIN TIME PARTIAL: CPT | Performed by: EMERGENCY MEDICINE

## 2018-11-08 PROCEDURE — 25010000002 SUCCINYLCHOLINE PER 20 MG

## 2018-11-08 PROCEDURE — 63710000001 INSULIN REGULAR HUMAN PER 5 UNITS: Performed by: EMERGENCY MEDICINE

## 2018-11-08 PROCEDURE — 86900 BLOOD TYPING SEROLOGIC ABO: CPT

## 2018-11-08 PROCEDURE — 25010000002 MIDAZOLAM 0.5 MG/ML 100 ML NS: Performed by: EMERGENCY MEDICINE

## 2018-11-08 PROCEDURE — 96375 TX/PRO/DX INJ NEW DRUG ADDON: CPT

## 2018-11-08 PROCEDURE — 83050 HGB METHEMOGLOBIN QUAN: CPT | Performed by: EMERGENCY MEDICINE

## 2018-11-08 PROCEDURE — 96366 THER/PROPH/DIAG IV INF ADDON: CPT

## 2018-11-08 PROCEDURE — 85025 COMPLETE CBC W/AUTO DIFF WBC: CPT | Performed by: EMERGENCY MEDICINE

## 2018-11-08 PROCEDURE — 92950 HEART/LUNG RESUSCITATION CPR: CPT

## 2018-11-08 PROCEDURE — 25010000002 DOPAMINE PER 40 MG: Performed by: EMERGENCY MEDICINE

## 2018-11-08 PROCEDURE — 86901 BLOOD TYPING SEROLOGIC RH(D): CPT | Performed by: EMERGENCY MEDICINE

## 2018-11-08 PROCEDURE — 36600 WITHDRAWAL OF ARTERIAL BLOOD: CPT | Performed by: EMERGENCY MEDICINE

## 2018-11-08 PROCEDURE — 87040 BLOOD CULTURE FOR BACTERIA: CPT | Performed by: EMERGENCY MEDICINE

## 2018-11-08 PROCEDURE — 96376 TX/PRO/DX INJ SAME DRUG ADON: CPT

## 2018-11-08 PROCEDURE — 80307 DRUG TEST PRSMV CHEM ANLYZR: CPT | Performed by: PHYSICIAN ASSISTANT

## 2018-11-08 PROCEDURE — 94002 VENT MGMT INPAT INIT DAY: CPT

## 2018-11-08 PROCEDURE — 31500 INSERT EMERGENCY AIRWAY: CPT

## 2018-11-08 PROCEDURE — 71045 X-RAY EXAM CHEST 1 VIEW: CPT

## 2018-11-08 RX ORDER — OMEPRAZOLE 20 MG/1
20 CAPSULE, DELAYED RELEASE ORAL DAILY
COMMUNITY

## 2018-11-08 RX ORDER — SODIUM CHLORIDE 9 MG/ML
INJECTION, SOLUTION INTRAVENOUS
Status: DISCONTINUED
Start: 2018-11-08 | End: 2018-11-08 | Stop reason: HOSPADM

## 2018-11-08 RX ORDER — DOPAMINE HYDROCHLORIDE 160 MG/100ML
2-20 INJECTION, SOLUTION INTRAVENOUS
Status: DISCONTINUED | OUTPATIENT
Start: 2018-11-08 | End: 2018-11-08 | Stop reason: HOSPADM

## 2018-11-08 RX ORDER — SODIUM CHLORIDE 9 MG/ML
INJECTION, SOLUTION INTRAVENOUS
Status: DISCONTINUED | OUTPATIENT
Start: 2018-11-08 | End: 2018-11-08 | Stop reason: HOSPADM

## 2018-11-08 RX ORDER — SODIUM CHLORIDE 450 MG/100ML
250 INJECTION, SOLUTION INTRAVENOUS CONTINUOUS
Status: DISCONTINUED | OUTPATIENT
Start: 2018-11-08 | End: 2018-11-08 | Stop reason: HOSPADM

## 2018-11-08 RX ORDER — POTASSIUM CHLORIDE 1.5 G/1.77G
10 POWDER, FOR SOLUTION ORAL AS NEEDED
Status: DISCONTINUED | OUTPATIENT
Start: 2018-11-08 | End: 2018-11-08 | Stop reason: HOSPADM

## 2018-11-08 RX ORDER — DEXTROSE MONOHYDRATE 25 G/50ML
12.5 INJECTION, SOLUTION INTRAVENOUS
Status: DISCONTINUED | OUTPATIENT
Start: 2018-11-08 | End: 2018-11-08 | Stop reason: HOSPADM

## 2018-11-08 RX ORDER — FAMOTIDINE 10 MG/ML
20 INJECTION, SOLUTION INTRAVENOUS ONCE
Status: COMPLETED | OUTPATIENT
Start: 2018-11-08 | End: 2018-11-08

## 2018-11-08 RX ORDER — VECURONIUM BROMIDE 1 MG/ML
10 INJECTION, POWDER, LYOPHILIZED, FOR SOLUTION INTRAVENOUS ONCE
Status: COMPLETED | OUTPATIENT
Start: 2018-11-08 | End: 2018-11-08

## 2018-11-08 RX ORDER — POTASSIUM CHLORIDE 750 MG/1
10 CAPSULE, EXTENDED RELEASE ORAL AS NEEDED
Status: DISCONTINUED | OUTPATIENT
Start: 2018-11-08 | End: 2018-11-08 | Stop reason: HOSPADM

## 2018-11-08 RX ORDER — POTASSIUM CHLORIDE 7.46 G/1000ML
10 INJECTION, SOLUTION INTRAVENOUS AS NEEDED
Status: DISCONTINUED | OUTPATIENT
Start: 2018-11-08 | End: 2018-11-08 | Stop reason: HOSPADM

## 2018-11-08 RX ORDER — SODIUM CHLORIDE AND POTASSIUM CHLORIDE 150; 450 MG/100ML; MG/100ML
250 INJECTION, SOLUTION INTRAVENOUS CONTINUOUS PRN
Status: DISCONTINUED | OUTPATIENT
Start: 2018-11-08 | End: 2018-11-08 | Stop reason: HOSPADM

## 2018-11-08 RX ORDER — POTASSIUM CHLORIDE 750 MG/1
20 CAPSULE, EXTENDED RELEASE ORAL AS NEEDED
Status: DISCONTINUED | OUTPATIENT
Start: 2018-11-08 | End: 2018-11-08 | Stop reason: HOSPADM

## 2018-11-08 RX ORDER — POTASSIUM CHLORIDE 1.5 G/1.77G
20 POWDER, FOR SOLUTION ORAL AS NEEDED
Status: DISCONTINUED | OUTPATIENT
Start: 2018-11-08 | End: 2018-11-08 | Stop reason: HOSPADM

## 2018-11-08 RX ORDER — MIDAZOLAM IN 0.9 % SOD.CHLORID 1 MG/ML
1-10 PLASTIC BAG, INJECTION (ML) INTRAVENOUS
Status: DISCONTINUED | OUTPATIENT
Start: 2018-11-08 | End: 2018-11-08 | Stop reason: HOSPADM

## 2018-11-08 RX ORDER — PANTOPRAZOLE SODIUM 40 MG/10ML
80 INJECTION, POWDER, LYOPHILIZED, FOR SOLUTION INTRAVENOUS ONCE
Status: COMPLETED | OUTPATIENT
Start: 2018-11-08 | End: 2018-11-08

## 2018-11-08 RX ORDER — POTASSIUM CHLORIDE 1.5 G/1.77G
40 POWDER, FOR SOLUTION ORAL AS NEEDED
Status: DISCONTINUED | OUTPATIENT
Start: 2018-11-08 | End: 2018-11-08 | Stop reason: HOSPADM

## 2018-11-08 RX ORDER — PANTOPRAZOLE SODIUM 40 MG/10ML
INJECTION, POWDER, LYOPHILIZED, FOR SOLUTION INTRAVENOUS
Status: DISCONTINUED
Start: 2018-11-08 | End: 2018-11-08 | Stop reason: HOSPADM

## 2018-11-08 RX ORDER — DEXTROSE AND SODIUM CHLORIDE 5; .45 G/100ML; G/100ML
150 INJECTION, SOLUTION INTRAVENOUS CONTINUOUS PRN
Status: DISCONTINUED | OUTPATIENT
Start: 2018-11-08 | End: 2018-11-08 | Stop reason: HOSPADM

## 2018-11-08 RX ORDER — POTASSIUM CHLORIDE 750 MG/1
40 CAPSULE, EXTENDED RELEASE ORAL AS NEEDED
Status: DISCONTINUED | OUTPATIENT
Start: 2018-11-08 | End: 2018-11-08 | Stop reason: HOSPADM

## 2018-11-08 RX ORDER — DEXTROSE, SODIUM CHLORIDE, AND POTASSIUM CHLORIDE 5; .45; .15 G/100ML; G/100ML; G/100ML
150 INJECTION INTRAVENOUS CONTINUOUS PRN
Status: DISCONTINUED | OUTPATIENT
Start: 2018-11-08 | End: 2018-11-08 | Stop reason: HOSPADM

## 2018-11-08 RX ORDER — PROPOFOL 10 MG/ML
VIAL (ML) INTRAVENOUS
Status: DISCONTINUED
Start: 2018-11-08 | End: 2018-11-08 | Stop reason: HOSPADM

## 2018-11-08 RX ORDER — ASPIRIN 300 MG/1
300 SUPPOSITORY RECTAL EVERY 6 HOURS PRN
Status: DISCONTINUED | OUTPATIENT
Start: 2018-11-08 | End: 2018-11-08 | Stop reason: HOSPADM

## 2018-11-08 RX ORDER — PANTOPRAZOLE SODIUM 40 MG/10ML
80 INJECTION, POWDER, LYOPHILIZED, FOR SOLUTION INTRAVENOUS ONCE
Status: DISCONTINUED | OUTPATIENT
Start: 2018-11-08 | End: 2018-11-08

## 2018-11-08 RX ADMIN — SODIUM CHLORIDE 1000 ML: 9 INJECTION, SOLUTION INTRAVENOUS at 05:30

## 2018-11-08 RX ADMIN — PANTOPRAZOLE SODIUM 8 MG/HR: 40 INJECTION, POWDER, FOR SOLUTION INTRAVENOUS at 06:09

## 2018-11-08 RX ADMIN — EPINEPHRINE 1 MG: 0.1 INJECTION, SOLUTION ENDOTRACHEAL; INTRACARDIAC; INTRAVENOUS at 04:49

## 2018-11-08 RX ADMIN — SODIUM CHLORIDE 1000 ML/HR: 9 INJECTION, SOLUTION INTRAVENOUS at 04:44

## 2018-11-08 RX ADMIN — VECURONIUM BROMIDE 10 MG: 1 INJECTION, POWDER, LYOPHILIZED, FOR SOLUTION INTRAVENOUS at 05:02

## 2018-11-08 RX ADMIN — SODIUM BICARBONATE 100 MEQ: 84 INJECTION, SOLUTION INTRAVENOUS at 04:48

## 2018-11-08 RX ADMIN — Medication 1 MG/HR: at 07:39

## 2018-11-08 RX ADMIN — PANTOPRAZOLE SODIUM 80 MG: 40 INJECTION, POWDER, FOR SOLUTION INTRAVENOUS at 06:08

## 2018-11-08 RX ADMIN — SODIUM CHLORIDE 0.1 UNITS/KG/HR: 9 INJECTION, SOLUTION INTRAVENOUS at 08:07

## 2018-11-08 RX ADMIN — SODIUM CHLORIDE 1000 ML: 9 INJECTION, SOLUTION INTRAVENOUS at 05:51

## 2018-11-08 RX ADMIN — HUMAN INSULIN 6.4 UNITS: 100 INJECTION, SOLUTION SUBCUTANEOUS at 08:28

## 2018-11-08 RX ADMIN — SODIUM CHLORIDE 1000 ML: 9 INJECTION, SOLUTION INTRAVENOUS at 07:57

## 2018-11-08 RX ADMIN — ASPIRIN 300 MG: 300 SUPPOSITORY RECTAL at 06:35

## 2018-11-08 RX ADMIN — DOPAMINE HYDROCHLORIDE 2 MCG/KG/MIN: 160 INJECTION, SOLUTION INTRAVENOUS at 06:21

## 2018-11-08 RX ADMIN — EPINEPHRINE 1 MG: 0.1 INJECTION, SOLUTION ENDOTRACHEAL; INTRACARDIAC; INTRAVENOUS at 04:46

## 2018-11-08 RX ADMIN — FAMOTIDINE 20 MG: 10 INJECTION, SOLUTION INTRAVENOUS at 06:12

## 2018-11-08 RX ADMIN — SODIUM CHLORIDE 1000 ML: 9 INJECTION, SOLUTION INTRAVENOUS at 05:50

## 2018-11-08 NOTE — ED NOTES
There are no beds available for the patient at [facility], they are going to try and get him into [facility].     Jamir Pruitt  11/08/18 6169

## 2018-11-08 NOTE — ED NOTES
Called spouse Anamaria at this time that was listed on face sheet, spouse states that patient had been vomiting Since Monday and then complained of severe chest pain, states that she called EMS, but has worked all night and had not planned on coming to the ED. Made aware that the condition of the patient had changed and that she needed to come to the ED, states that if it is that serious, then she would put some clothes on and come to the ED.     Rachel Segundo RN  11/08/18 0537

## 2018-11-08 NOTE — ED NOTES
Per verbal order of Dr. Feng to stop Cardizem at this time     Rachel Segundo, RN  11/08/18 0634

## 2018-11-08 NOTE — ED NOTES
Pt resting comfortably. Skin Pink,Warm,Dry. Pupils PERRLA, heart rhythm sinus with rate 90, MD aware of BR, All IV sites dry intact and labeled, ET tube remains at 25, OG remains at 78cm at right corner of lip, Corinne hugger and warming fluids in place.report given to PRESLEY Sheldon, Katt M, RN  11/08/18 3492

## 2018-11-08 NOTE — ED NOTES
[facility] en route to transport patient, [place], to notify [facility] they are needed to transport second patient.  Security made aware, [facility] aware second air medical en route also.  [facility] notified.       Nelly Foster RN  11/08/18 0870

## 2018-11-08 NOTE — ED PROVIDER NOTES
Subjective   Pt arrived by EMS from home for vomiting for 2 days.  Per EMS pt c/o chest pain.    Pt found to be apneic and pulseless.  ACLS resuscitation started.    Per wife, patient has been vomiting and had dark liquid emesis and stools.    Pt has h/o drinking.  Wife unsure if he has been drinking.  Pt has been treated for Htn and hyperlipidemia in the past.        History provided by:  EMS personnel and spouse  History limited by:  Patient unresponsive  Cardiac Arrest   Witnessed by:  Healthcare provider ([facility])  Incident location: ER.  Time before BLS initiated:  Immediate  Time before ALS initiated:  Immediate  Condition upon EMS arrival:  Agonal respirations  Pulse:  Absent  Treatments prior to arrival:  ACLS protocol  Medications given prior to ED:  Sodium bicarbonate and epinephrine  Airway:  Intubation in ED  Rhythm on admission to ED:  Asystole  Associated symptoms: chest pain, difficulty breathing, heartburn and vomiting    Associated symptoms: no dizziness, no fatigue, no loss of consciousness, no palpitations, no shortness of breath, no syncope and no weakness    Risk factors: diabetes mellitus    Risk factors: no COPD, no drug overdose, no head injury, no heart problem, no hyperlipidemia and no trauma        Review of Systems   Unable to perform ROS: Patient unresponsive   Constitutional: Negative for fatigue.   Respiratory: Negative for shortness of breath.    Cardiovascular: Positive for chest pain. Negative for palpitations and syncope.   Gastrointestinal: Positive for blood in stool, heartburn and vomiting.   Neurological: Negative for dizziness, loss of consciousness and weakness.       Past Medical History:   Diagnosis Date   • Diabetes mellitus (CMS/HCC)    • Thyroid nodule        No Known Allergies    Past Surgical History:   Procedure Laterality Date   • APPENDECTOMY         Family History   Family history unknown: Yes       Social History     Socioeconomic History   • Marital  status:      Spouse name: Not on file   • Number of children: Not on file   • Years of education: Not on file   • Highest education level: Not on file   Tobacco Use   • Smoking status: Former Smoker   • Smokeless tobacco: Current User     Types: Snuff   Substance and Sexual Activity   • Alcohol use: Yes     Comment: Last use - today   • Drug use: No   • Sexual activity: Defer           Objective   Physical Exam   Constitutional: He appears well-developed.   Thin and frail, cool to touch   HENT:   Head: Normocephalic and atraumatic.   Nose: Nose normal.   Mouth/Throat: Oropharynx is clear and moist.   Eyes: Conjunctivae and EOM are normal. Right eye exhibits no discharge. Left eye exhibits no discharge. No scleral icterus.   Neck: Normal range of motion. Neck supple. JVD present. No tracheal deviation present. No thyromegaly present.   Cardiovascular:   Asystole, pulseless   Pulmonary/Chest:   agonal   Abdominal: He exhibits no distension.   Musculoskeletal: He exhibits no edema or deformity.   Lymphadenopathy:     He has no cervical adenopathy.   Neurological:   Unresponsive, GCS-3   Skin: There is pallor.   Psychiatric:   Unable to assess   Nursing note and vitals reviewed.      Critical Care  Performed by: Govind Feng MD  Authorized by: Govind Feng MD     Critical care provider statement:     Critical care time (minutes):  90    Critical care start time:  11/8/2018 4:41 AM    Critical care end time:  11/8/2018 6:27 AM    Critical care time was exclusive of:  Separately billable procedures and treating other patients    Critical care was necessary to treat or prevent imminent or life-threatening deterioration of the following conditions:  Cardiac failure, circulatory failure, endocrine crisis, respiratory failure and metabolic crisis    Critical care was time spent personally by me on the following activities:  Blood draw for specimens, development of treatment plan with patient or  surrogate, discussions with consultants, evaluation of patient's response to treatment, examination of patient, gastric intubation, obtaining history from patient or surrogate, ordering and performing treatments and interventions, ordering and review of laboratory studies, ordering and review of radiographic studies, pulse oximetry, re-evaluation of patient's condition, vascular access procedures and ventilator management    I assumed direction of critical care for this patient from another provider in my specialty: no    Central Line At Bedside  Date/Time: 11/8/2018 6:31 AM  Performed by: Govind Feng MD  Authorized by: Govind Feng MD     Consent:     Consent obtained:  Emergent situation  Pre-procedure details:     Hand hygiene: Hand hygiene performed prior to insertion      Sterile barrier technique: All elements of maximal sterile technique followed      Skin preparation:  2% chlorhexidine  Anesthesia (see MAR for exact dosages):     Anesthesia method:  None  Procedure details:     Location:  R internal jugular    Patient position:  Flat    Procedural supplies:  Triple lumen    Catheter size:  7 Fr    Landmarks identified: yes      Number of attempts:  1    Successful placement: yes    Post-procedure details:     Post-procedure:  Dressing applied and line sutured    Assessment:  Blood return through all ports, free fluid flow, no pneumothorax on x-ray and placement verified by x-ray    Patient tolerance of procedure:  Tolerated well, no immediate complications  Intubation  Date/Time: 11/8/2018 6:32 AM  Performed by: Govind Feng MD  Authorized by: Govind Feng MD     Consent:     Consent obtained:  Emergent situation  Pre-procedure details:     Patient status:  Unresponsive    Mallampati score:  II    Pretreatment medications:  None    Paralytics:  Vecuronium  Procedure details:     Preoxygenation:  Bag valve mask    CPR in progress: yes      Intubation method:  Oral     Oral intubation technique:  Direct    Laryngoscope blade:  Mac 4    Tube size (mm):  8.0    Tube type:  Cuffed    Number of attempts:  1    Cricoid pressure: yes      Tube visualized through cords: yes    Placement assessment:     ETT to lip:  23    Tube secured with:  ETT hamilton    Breath sounds:  Equal and absent over the epigastrium    Placement verification: chest rise, condensation, CXR verification, direct visualization, equal breath sounds and tube exhalation      Placement verification comment:  Color change capnography    CXR findings:  ETT in proper place  Post-procedure details:     Patient tolerance of procedure:  Tolerated well, no immediate complications               ED Course  ED Course as of Nov 10 0354   Thu Nov 08, 2018   0722 D/W Dr Negro at [facility] [place] to [place]  [TZ]   0752 D/W Dr Anthony accepting to ICU at [facility]  [TZ]      ED Course User Index  [TZ] Govind Feng MD      XR Chest 1 View   Final Result   ET tube tip at level of clavicles. Right central line with   tip in SVC. NG tube is in the proximal stomach and should be advanced.        This report was finalized on 11/8/2018 7:48 AM by Dr. Herman Ortega MD.            Labs Reviewed   COMPREHENSIVE METABOLIC PANEL - Abnormal; Notable for the following components:       Result Value    Glucose 691 (*)     BUN 43 (*)     Creatinine 2.06 (*)     Potassium 3.1 (*)     Chloride 96 (*)     CO2 <10.0 (*)     Total Protein 5.5 (*)     Albumin 3.40 (*)     ALT (SGPT) 49 (*)     AST (SGOT) 69 (*)     eGFR Non  Amer 34 (*)     All other components within normal limits   PROTIME-INR - Abnormal; Notable for the following components:    Protime 16.9 (*)     INR 1.34 (*)     All other components within normal limits    Narrative:     Suggested INR therapeutic range for stable oral anticoagulant therapy:    Low Intensity therapy:   1.5-2.0  Moderate Intensity therapy:   2.0-3.0  High Intensity therapy:   2.5-4.0   URINALYSIS W/  MICROSCOPIC IF INDICATED (NO CULTURE) - Abnormal; Notable for the following components:    Appearance, UA Cloudy (*)     Specific Gravity, UA >1.030 (*)     Glucose, UA >=1000 mg/dL (3+) (*)     Ketones, UA 40 mg/dL (2+) (*)     Blood, UA Trace (*)     All other components within normal limits   URINE DRUG SCREEN - Abnormal; Notable for the following components:    THC, Screen, Urine Positive (*)     All other components within normal limits    Narrative:     Negative Thresholds For Drugs Screened:                  Amphetamines              1000 ng/ml               Barbiturates               200 ng/ml               Benzodiazepines            200 ng/ml              Cocaine                    300 ng/ml              Methadone                  300 ng/ml              Opiates                    300 ng/ml               Phencyclidine               25 ng/ml               THC                         50 ng/ml              6-Acetyl Morphine           10 ng/ml              Buprenorphine                5 ng/ml              Oxycodone                  300 ng/ml    The reference range for all drugs tested is negative. This report includes final unconfirmed qualitative results to be used for medical treatment purposes only. Unconfirmed results must not be used for non-medical purposes such as employment or legal testing. Clinical consideration should be applied to any drug of abuse test, especially when unconfirmed quantitative results are used.     CK - Abnormal; Notable for the following components:    Creatine Kinase 336 (*)     All other components within normal limits   TROPONIN (IN-HOUSE) - Abnormal; Notable for the following components:    Troponin I 11.732 (*)     All other components within normal limits    Narrative:     Ultra Troponin I Reference Range:         <=0.039 ng/mL: Negative    0.04-0.779 ng/mL: Indeterminate Range. Suspicious of MI.  Clinical correlation required.       >=0.78  ng/mL: Consistent with myocardial  injury.  Clinical correlation required.   DIGOXIN LEVEL - Abnormal; Notable for the following components:    Digoxin <0.10 (*)     All other components within normal limits   BLOOD GAS, ARTERIAL - Abnormal; Notable for the following components:    pH, Arterial 7.217 (*)     pCO2, Arterial 23.6 (*)     pO2, Arterial 480.2 (*)     HCO3, Arterial 9.4 (*)     Hematocrit, Blood Gas 36.0 (*)     All other components within normal limits   CBC WITH AUTO DIFFERENTIAL - Abnormal; Notable for the following components:    WBC 22.74 (*)     RBC 3.41 (*)     Hemoglobin 10.4 (*)     Hematocrit 31.7 (*)     MCHC 32.8 (*)     MPV 10.8 (*)     Neutrophil % 86.3 (*)     Lymphocyte % 7.4 (*)     Immature Grans % 1.0 (*)     Neutrophils, Absolute 19.64 (*)     Monocytes, Absolute 1.16 (*)     Immature Grans, Absolute 0.22 (*)     All other components within normal limits   URINALYSIS, MICROSCOPIC ONLY - Abnormal; Notable for the following components:    RBC, UA 3-5 (*)     Squamous Epithelial Cells, UA 3-6 (*)     All other components within normal limits   ACETONE - Abnormal; Notable for the following components:    Acetone Small (*)     All other components within normal limits   OSMOLALITY, CALCULATED - Abnormal; Notable for the following components:    Osmolality Calc 315.7 (*)     All other components within normal limits   LIPID PANEL - Abnormal; Notable for the following components:    Triglycerides 214 (*)     HDL Cholesterol 41 (*)     All other components within normal limits    Narrative:     Cholesterol Reference Ranges  (U.S. Department of Health and Human Services ATP III Classifications)    Desirable          <200 mg/dL  Borderline High    200-239 mg/dL  High Risk          >240 mg/dL      Triglyceride Reference Ranges  (U.S. Department of Health and Human Services ATP III Classifications)    Normal           <150 mg/dL  Borderline High  150-199 mg/dL  High             200-499 mg/dL  Very High        >500 mg/dL    HDL  Reference Ranges  (U.S. Department of Health and Human Services ATP III Classifcations)    Low     <40 mg/dl (major risk factor for CHD)  High    >60 mg/dl ('negative' risk factor for CHD)        LDL Reference Ranges  (U.S. Department of Health and Human Services ATP III Classifcations)    Optimal          <100 mg/dL  Near Optimal     100-129 mg/dL  Borderline High  130-159 mg/dL  High             160-189 mg/dL  Very High        >189 mg/dL   BLOOD GAS, ARTERIAL - Abnormal; Notable for the following components:    pH, Arterial 7.139 (*)     pCO2, Arterial 28.9 (*)     pO2, Arterial 124.4 (*)     HCO3, Arterial 9.6 (*)     Hemoglobin, Blood Gas 11.3 (*)     Hematocrit, Blood Gas 33.0 (*)     All other components within normal limits   LACTIC ACID, PLASMA - Abnormal; Notable for the following components:    Lactate 14.8 (*)     All other components within normal limits   PHOSPHORUS - Abnormal; Notable for the following components:    Phosphorus 6.0 (*)     All other components within normal limits   BASIC METABOLIC PANEL - Abnormal; Notable for the following components:    Glucose 727 (*)     BUN 48 (*)     Creatinine 1.95 (*)     CO2 11.7 (*)     eGFR Non  Amer 36 (*)     Anion Gap 24.3 (*)     All other components within normal limits    Narrative:     GFR Normal >60  Chronic Kidney Disease <60  Kidney Failure <15   OSMOLALITY, CALCULATED - Abnormal; Notable for the following components:    Osmolality Calc 317.6 (*)     All other components within normal limits   POCT GLUCOSE FINGERSTICK - Abnormal; Notable for the following components:    Glucose 568 (*)     All other components within normal limits    Narrative:     RN Notified Meter: [id number] : [id number] Paulie Valdes   BLOOD CULTURE - Normal   BLOOD CULTURE - Normal   APTT - Normal    Narrative:     PTT Heparin Therapeutic Range:  59 - 95 seconds   MAGNESIUM - Normal   ETHANOL    Narrative:     >/= 80.0 legally intoxicated   LACTIC ACID REFLEX  TIMER   POCT GLUCOSE FINGERSTICK   POCT GLUCOSE FINGERSTICK   TYPE AND SCREEN   ABORH 2ND SPECIMEN VERIFICATION   CBC AND DIFFERENTIAL    Narrative:     The following orders were created for panel order CBC & Differential.  Procedure                               Abnormality         Status                     ---------                               -----------         ------                     CBC Auto Differential[[id number]]        Abnormal            Final result                 Please view results for these tests on the individual orders.   KETONE BODIES SERUM    Narrative:     The following orders were created for panel order Ketone Bodies, Serum (Not performed at [place]).  Procedure                               Abnormality         Status                     ---------                               -----------         ------                     Acetone[[id number]]                      Abnormal            Final result                 Please view results for these tests on the individual orders.        Medication List      ASK your doctor about these medications    aspirin 81 MG EC tablet     insulin glargine 100 UNIT/ML injection  Commonly known as:  LANTUS     levothyroxine 100 MCG tablet  Commonly known as:  SYNTHROID, LEVOTHROID     omeprazole 20 MG capsule  Commonly known as:  priLOSEC                MDM  Number of Diagnoses or Management Options  Cardiac arrest (CMS/HCC): new and requires workup  Diabetic ketoacidosis with coma associated with diabetes mellitus due to underlying condition (CMS/HCC): new and requires workup  NSTEMI (non-ST elevated myocardial infarction) (CMS/HCC): new and requires workup  Upper GI hemorrhage: new and requires workup     Amount and/or Complexity of Data Reviewed  Clinical lab tests: ordered and reviewed  Tests in the radiology section of CPT®: ordered and reviewed  Obtain history from someone other than the patient: yes  Discuss the patient with other providers: yes    Risk  of Complications, Morbidity, and/or Mortality  Presenting problems: high  Diagnostic procedures: high  Management options: high    Patient Progress  Patient progress: (Critical)        Final diagnoses:   Cardiac arrest (CMS/HCC)   Diabetic ketoacidosis with coma associated with diabetes mellitus due to underlying condition (CMS/HCC)   Upper GI hemorrhage   NSTEMI (non-ST elevated myocardial infarction) (CMS/[facility])            Govind Feng MD  11/10/18 0355

## 2018-11-08 NOTE — ED NOTES
MD Feng and RN went to [facility] and spoke with patient spouse Anamaria and son at this time. States that patient began having dark emesis late Monday night and also loose dark stools that began that same day. Reports that patient was an alcoholic in the past but had quit. Also states that patient has had stomach problems and that is why he takes Prilosec. Spouse also states that he has been hospitalized several times related to DKA and that patient does not control his glucose. Updated family on patient condition at this time. States that if patient has to be transferred to send him to [facility] in [place].      Rachel Segundo RN  11/08/18 4741

## 2018-11-08 NOTE — ED NOTES
[facility] called, the pt is going to their Neuro ICU, and the number for report is [phone].     Jamir Pruitt  11/08/18 0803

## 2018-11-08 NOTE — ED NOTES
Spoke with Andrey from [facility]. They are able to take patient to [facility]. They will be on stand by for patient.      Denia Lagos RN  11/08/18 2179

## 2018-11-08 NOTE — ED NOTES
I called [facility] to transfer the patient to [facility].     Jamir Pruitt  11/08/18 0803

## 2018-11-13 ENCOUNTER — OFFICE VISIT (OUTPATIENT)
Dept: PHYSICAL THERAPY | Facility: REHABILITATION | Age: 58
End: 2018-11-13
Payer: COMMERCIAL

## 2018-11-13 DIAGNOSIS — M25.561 ACUTE PAIN OF RIGHT KNEE: Primary | ICD-10-CM

## 2018-11-13 LAB
BACTERIA SPEC AEROBE CULT: NORMAL
BACTERIA SPEC AEROBE CULT: NORMAL

## 2018-11-13 PROCEDURE — 97110 THERAPEUTIC EXERCISES: CPT

## 2018-11-13 PROCEDURE — 97530 THERAPEUTIC ACTIVITIES: CPT

## 2018-11-13 PROCEDURE — 97112 NEUROMUSCULAR REEDUCATION: CPT

## 2018-11-13 NOTE — PROGRESS NOTES
Daily Note     Today's date: 2018  Patient name: Kisha Plata  : 1960  MRN: [id number]  Referring provider: Kitty Brown MD  Dx:   Encounter Diagnosis     ICD-10-CM    1  Acute pain of right knee M25 561                   Subjective: Patient reports waking up with sharp shooting pains         Objective: See treatment diary below  Precautions: back pain, prior right meniscal surgery     Daily Treatment Diary      Manual  10/9  10/18 10/23   10/25  10/30             Medial patellar taping FB                      prone quad stretch   HS [facility]                  KT taping for swelling        HS                IASTM med/lat knee         HS                                           Exercise Diary  10/9  10/11  10/17  10/18  10/23  10/25  10/30  11/1  11/7  11/13   Bridging- S/L 2x10, 5"  2x10, 5"  2x10 5"  5"2x10  2x10, 5"    NP         SLR flexion 2x5, 5"  2x5, 5"  2x10 5"  5" 2x10  2x10, 5"  5' 2x10  NP         Total gym  Lvl 6, 6'  5', lvl 6  5' lvl 6  Lvl 6 5'  L6, 5'  Lvl 3 8'  NP    Lvl 6    8'  Lvl 7   8'   Recumbent bike 8', lvl 2  10', lvl 2  10'  10'   10'  10'   10'  10'  10'  10'    stand hip abd    3x10 OTB  3x10 OTB B/L   3x10   OTB  B/L  OTB, 3x10 ea    NP          SL ball toss- 2 direction   2x20 ea   2x20 ea  2x20 ea  2x20 ea    NP          step up blue   3x15  20x ea fsu/lsu   20x ea  20x ea    NP         BOSU mini squat   2x10, 5"  2x10 5"  5" 2x10  2x10, 5"    5" 2x15  5" 2x15    5" 2x15    prone SLR       3" 2x10  2x10, 5"  5" 2x10  NP    3" 3x10  3" 3x10    prone quad sets           5" 2x10  NP          storks GTB 4-way             2x15          rockerboard             2x15  2x20       Leg press             #55 x10  #85 2x10 3x15 #85-#130  85# 3x15  55#  3x15  85# 3x15  55#  3x15   Deadifts             2x15         SL toe tap               2x10    HOLD   Lat step down from foam               3x20  3x20  3x20   Wall squats w/ pball               2x20       clamshells         5" 2x10 5" 3x10   sidelying SLR w/ ER         2x10 5" 3x10   sidelying total gym         Lvl 5  3x15 Lvl 5  3x15            Assessment: Patient reports soreness post tx, no significant increase in pain during or post tx  Plan: Continue per plan of care

## 2018-11-15 ENCOUNTER — APPOINTMENT (OUTPATIENT)
Dept: PHYSICAL THERAPY | Facility: REHABILITATION | Age: 58
End: 2018-11-15
Payer: COMMERCIAL

## 2018-11-19 ENCOUNTER — OFFICE VISIT (OUTPATIENT)
Dept: PHYSICAL THERAPY | Facility: REHABILITATION | Age: 58
End: 2018-11-19
Payer: COMMERCIAL

## 2018-11-19 DIAGNOSIS — M25.561 ACUTE PAIN OF RIGHT KNEE: Primary | ICD-10-CM

## 2018-11-19 PROCEDURE — 97112 NEUROMUSCULAR REEDUCATION: CPT

## 2018-11-19 PROCEDURE — 97140 MANUAL THERAPY 1/> REGIONS: CPT

## 2018-11-19 PROCEDURE — 97110 THERAPEUTIC EXERCISES: CPT

## 2018-11-19 NOTE — PROGRESS NOTES
Daily Note     Today's date: 2018  Patient name: Katie Castillo  : 1960  MRN: [id number]  Referring provider: Joanne Blair MD  Dx:   Encounter Diagnosis     ICD-10-CM    1  Acute pain of right knee M25 561                   Subjective: Patient reports his knee feels the same no changes     1:1 w/ PTA 45 min       Objective: See treatment diary below  Precautions: back pain, prior right meniscal surgery     Daily Treatment Diary      Manual  10/9  10/18 10/23   10/25  10/30  11/19           Medial patellar taping FB                      prone quad stretch   HS  LH                  KT taping for swelling        HS                IASTM med/lat knee         HS              IASTM to quad tendon            HS                 Exercise Diary   10/23  10/25  10/30  11/1  11/7  11/13 11/19   Bridging- S/L  2x10, 5"    NP          SLR flexion  2x10, 5"  5' 2x10  NP          Total gym   L6, 5'  Lvl 3 8'  NP    Lvl 6    8'  Lvl 7   8'    Recumbent bike  10'  10'   10'  10'  10'  10' 10'    stand hip abd   OTB, 3x10 ea    NP           SL ball toss- 2 direction  2x20 ea    NP           step up blue  20x ea    NP          BOSU mini squat  2x10, 5"    5" 2x15  5" 2x15    5" 2x15 5" 2x15    prone SLR  2x10, 5"  5" 2x10  NP    3" 3x10  3" 3x10 3" 3x10    prone quad sets   5" 2x10  NP           storks GTB 4-way     2x15           rockerboard     2x15  2x20        Leg press     #55 x10  #85 2x10 3x15 #85-#130  85# 3x15  55#  3x15  85# 3x15  55#  3x15 115# 3x30  85#  3x15        2x15          SL toe tap       2x10    HOLD    Lat step down from foam       3x20  3x20  3x20    Wall squats w/ pball       2x20     3x15   clamshells     5" 2x10 5" 3x10 5"   sidelying SLR w/ ER     2x10 5" 3x10 5" 3x10   sidelying total gym     Lvl 5  3x15 Lvl 5  3x15 lvl 5  3x15   ecc squats       2x10                                Assessment: Patient educated on eccentric TE while at the gym as well as proper use of foot petals in his bicycle as to not dominate with quads  Plan: Continue per plan of care

## 2018-11-21 ENCOUNTER — OFFICE VISIT (OUTPATIENT)
Dept: PHYSICAL THERAPY | Facility: REHABILITATION | Age: 58
End: 2018-11-21
Payer: COMMERCIAL

## 2018-11-21 DIAGNOSIS — M25.561 ACUTE PAIN OF RIGHT KNEE: Primary | ICD-10-CM

## 2018-11-21 PROCEDURE — 97530 THERAPEUTIC ACTIVITIES: CPT

## 2018-11-21 PROCEDURE — 97140 MANUAL THERAPY 1/> REGIONS: CPT

## 2018-11-21 PROCEDURE — 97110 THERAPEUTIC EXERCISES: CPT

## 2018-11-21 PROCEDURE — 97112 NEUROMUSCULAR REEDUCATION: CPT

## 2018-11-21 NOTE — PROGRESS NOTES
Daily Note     Today's date: 2018  Patient name: Alondra Chaudhry  : 1960  MRN: [id number]  Referring provider: Annita Goel MD  Dx:   Encounter Diagnosis     ICD-10-CM    1  Acute pain of right knee M25 561                   Subjective: Patient states that his knees felt good after LV with the IASTM    1:1 w/ PTA       Objective: See treatment diary below  Precautions: back pain, prior right meniscal surgery     Daily Treatment Diary      Manual  10/9  10/18 10/23   10/25  10/30  11/19  11/21         Medial patellar taping FB                      prone quad stretch   HS  LH                  KT taping for swelling        HS                IASTM med/lat knee         HS              IASTM to quad tendon            HS  HS               Exercise Diary   10/23  10/25  10/30  11/1  11/7  11/13 11/19 11/21   Bridging- S/L  2x10, 5"    NP           SLR flexion  2x10, 5"  5' 2x10  NP           Total gym   L6, 5'  Lvl 3 8'  NP    Lvl 6    8'  Lvl 7   8'  Lvl 8    8'   Recumbent bike  10'  10'   10'  10'  10'  10' 10'     stand hip abd   OTB, 3x10 ea    NP            SL ball toss- 2 direction  2x20 ea    NP            step up blue  20x ea    NP           BOSU mini squat  2x10, 5"    5" 2x15  5" 2x15    5" 2x15 5" 2x15     prone SLR  2x10, 5"  5" 2x10  NP    3" 3x10  3" 3x10 3" 3x10     prone quad sets   5" 2x10  NP            [employer] GTB 4-way     2x15            rockerboard     2x15  2x20         Leg press     #55 x10  #85 2x10 3x15 #85-#130  85# 3x15  55#  3x15  85# 3x15  55#  3x15 115# 3x30  85#  3x15 130# 3x30  85#   3x15        2x15           SL toe tap       2x10    HOLD     Lat step down from foam       3x20  3x20  3x20     Wall squats w/ pball       2x20     3x15    clamshells     5" 2x10 5" 3x10 5"    sidelying SLR w/ ER     2x10 5" 3x10 5" 3x10    sidelying total gym     Lvl 5  3x15 Lvl 5  3x15 lvl 5  3x15 Lvl 6  3x15   ecc squats       2x10    Reverse slide lunge        3x10   Lunge w/ opp leg elevated        3x20   Staggered stance squats        3x10 ea                                  Assessment: Patient did well with new Te added today, reports no increased pain during or post tx  Plan: Continue per plan of care

## 2018-11-27 ENCOUNTER — APPOINTMENT (OUTPATIENT)
Dept: PHYSICAL THERAPY | Facility: REHABILITATION | Age: 58
End: 2018-11-27
Payer: COMMERCIAL

## 2018-11-28 ENCOUNTER — OFFICE VISIT (OUTPATIENT)
Dept: PHYSICAL THERAPY | Facility: REHABILITATION | Age: 58
End: 2018-11-28
Payer: COMMERCIAL

## 2018-11-28 DIAGNOSIS — M25.561 ACUTE PAIN OF RIGHT KNEE: Primary | ICD-10-CM

## 2018-11-28 PROCEDURE — 97112 NEUROMUSCULAR REEDUCATION: CPT

## 2018-11-28 PROCEDURE — 97140 MANUAL THERAPY 1/> REGIONS: CPT

## 2018-11-28 PROCEDURE — 97110 THERAPEUTIC EXERCISES: CPT

## 2018-11-28 NOTE — PROGRESS NOTES
Daily Note     Today's date: 2018  Patient name: Dioni Norris  : 1960  MRN: [id number]  Referring provider: Chary Guillen MD  Dx:   Encounter Diagnosis     ICD-10-CM    1  Acute pain of right knee M25 561                   Subjective: Pt reported having good and bad days  Pt noted still feeling weakness        Objective: See treatment diary below  Manual  10/9  10/18 10/23   10/25  10/30  11/19  11/21  11/28       Medial patellar taping FB                      prone quad stretch   HS  LH                  KT taping for swelling        HS                IASTM med/lat knee         HS              IASTM to quad tendon            HS  HS  TC             Exercise Diary   10/23  10/25  10/30  11/1  11/7  11/13 11/19 11/21 11/28   Bridging- S/L  2x10, 5"    NP              SLR flexion  2x10, 5"  5' 2x10  NP              Total gym   L6, 5'  Lvl 3 8'  NP    Lvl 6    8'  Lvl 7   8'   Lvl 8    8' Lvl 8  8'   Recumbent bike  10'  10'   10'  10'  10'  10' 10'   10'    stand hip abd   OTB, 3x10 ea    NP               SL ball toss- 2 direction  2x20 ea    NP               step up blue  20x ea    NP              BOSU mini squat  2x10, 5"    5" 2x15  5" 2x15    5" 2x15 5" 2x15       prone SLR  2x10, 5"  5" 2x10  NP    3" 3x10  3" 3x10 3" 3x10       prone quad sets   5" 2x10  NP               storks GTB 4-way     2x15               rockerboard     2x15  2x20            Leg press     #55 x10  #85 2x10 3x15 #85-#130  85# 3x15  55#  3x15  85# 3x15  55#  3x15 115# 3x30  85#  3x15 130# 3x30  85#   3x15 130#  3x30  85# (SLS)  3x15         2x15              SL toe tap       2x10    HOLD        Lat step down from foam       3x20  3x20  3x20        Wall squats w/ pball       2x20     3x15      clamshells         5" 2x10 5" 3x10 5"      sidelying SLR w/ ER         2x10 5" 3x10 5" 3x10      sidelying total gym         Lvl 5  3x15 Lvl 5  3x15 lvl 5  3x15 Lvl 6  3x15 Lvl 6  3x15   ecc squats             2x10      Reverse slide lunge               3x10 3x10   Lunge w/ opp leg elevated               3x20 3x20   Staggered stance squats               3x10 ea 3x10 ea                                                     Assessment: Tolerated treatment well  Patient demonstrated fatigue post treatment and exhibited good technique with therapeutic exercises  Occasional VC's needed for correct technique throughout session  Mild to moderate restrictions noted with GISTM to quad  Pt noted relief post       Plan: Continue per plan of care

## 2018-11-29 ENCOUNTER — OFFICE VISIT (OUTPATIENT)
Dept: PHYSICAL THERAPY | Facility: REHABILITATION | Age: 58
End: 2018-11-29
Payer: COMMERCIAL

## 2018-11-29 DIAGNOSIS — M25.561 ACUTE PAIN OF RIGHT KNEE: Primary | ICD-10-CM

## 2018-11-29 PROCEDURE — 97530 THERAPEUTIC ACTIVITIES: CPT

## 2018-11-29 PROCEDURE — 97110 THERAPEUTIC EXERCISES: CPT

## 2018-11-29 PROCEDURE — G8991 OTHER PT/OT GOAL STATUS: HCPCS

## 2018-11-29 PROCEDURE — 97140 MANUAL THERAPY 1/> REGIONS: CPT

## 2018-11-29 PROCEDURE — 97112 NEUROMUSCULAR REEDUCATION: CPT

## 2018-11-29 PROCEDURE — G8990 OTHER PT/OT CURRENT STATUS: HCPCS

## 2018-11-29 NOTE — PROGRESS NOTES
Daily Note     Today's date: 2018  Patient name: Niya Lieberman  : 1960  MRN: [id number]  Referring provider: Rose Marie Reyes MD  Dx:   Encounter Diagnosis     ICD-10-CM    1   Acute pain of right knee M25 561                   Subjective: Pt states that his knee has been feeling better since LV      Objective: See treatment diary below  Manual  10/9  10/18 10/23   10/25  10/30  11/19  11/21  11/28  11/29     Medial patellar taping FB                      prone quad stretch   HS  LH                  KT taping for swelling        HS                IASTM med/lat knee         HS              IASTM to quad tendon            HS  HS  TC  HS           Exercise Diary   10/23  10/25  10/30  11/1  11/7  11/13 11/19 11/21 11/28 11/29   Bridging- S/L  2x10, 5"    NP               SLR flexion  2x10, 5"  5' 2x10  NP               Total gym   L6, 5'  Lvl 3 8'  NP    Lvl 6    8'  Lvl 7   8'   Lvl 8    8' Lvl 8  8'    Recumbent bike  10'  10'   10'  10'  10'  10' 10'   10' 10'    stand hip abd   OTB, 3x10 ea    NP                SL ball toss- 2 direction  2x20 ea    NP                step up blue  20x ea    NP               BOSU mini squat  2x10, 5"    5" 2x15  5" 2x15    5" 2x15 5" 2x15        prone SLR  2x10, 5"  5" 2x10  NP    3" 3x10  3" 3x10 3" 3x10        prone quad sets   5" 2x10  NP                storks GTB 4-way     2x15                rockerboard     2x15  2x20             Leg press     #55 x10  #85 2x10 3x15 #85-#130  85# 3x15  55#  3x15  85# 3x15  55#  3x15 115# 3x30  85#  3x15 130# 3x30  85#   3x15 130#  3x30  85# (SLS)  3x15 160# D/L  115# S/L  3x30         2x15               SL toe tap       2x10    HOLD         Lat step down from foam       3x20  3x20  3x20         Wall squats w/ pball       2x20     3x15       clamshells         5" 2x10 5" 3x10 5"       sidelying SLR w/ ER         2x10 5" 3x10 5" 3x10       sidelying total gym         Lvl 5  3x15 Lvl 5  3x15 lvl 5  3x15 Lvl 6  3x15 Lvl 6  3x15 Lvl 6  3x15   ecc squats             2x10       Reverse slide lunge               3x10 3x10 3x10   Lunge w/ opp leg elevated               3x20 3x20 3x20   Staggered stance squats               3x10 ea 3x10 ea  3x10ea   BOSU squat stance w/ ball toss                  2'    box jumps                  4" x15   skiiers          x30   Mini heisman trophy jumps          x40          Assessment: Patient did well with all new TE as listed reports no pain during or post tx  Plan: Continue per plan of care

## 2018-11-29 NOTE — LETTER
2018    MD Gema Sarabia [address] [facility]    Patient: Zion Choi   YOB: 1960   Date of Visit: 2018     Encounter Diagnosis     ICD-10-CM    1  Acute pain of right knee M25 561        Dear Dr Keith Nevarez:    Please review the attached Plan of Care from [facility]  recent visit  Please verify that you agree therapy should continue by signing the attached document and sending it back to our office  If you have any questions or concerns, please don't hesitate to call  Sincerely,    Yeimi Farris, PT      Referring Provider:      I certify that I have read the below Plan of Care and certify the need for these services furnished under this plan of treatment while under my care  MD Gema Sarabia [address]: [phone]          Daily Note     Today's date: 2018  Patient name: Zion Choi  : 1960  MRN: [id number]  Referring provider: Jose Jules MD  Dx:   Encounter Diagnosis     ICD-10-CM    1   Acute pain of right knee M25 561                   Subjective: Pt states that his knee has been feeling better since LV      Objective: See treatment diary below  Manual  10/9  10/18 10/23   10/25  10/30  11/19  11/21  11/28  11/29     Medial patellar taping FB                      prone quad stretch   HS  LH                  KT taping for swelling        HS                IASTM med/lat knee         HS              IASTM to quad tendon            HS  HS  TC  HS           Exercise Diary   10/23  10/25  10/30  11/1  11/7  11/13 11/19 11/21 11/28 11/29   Bridging- S/L  2x10, 5"    NP               SLR flexion  2x10, 5"  5' 2x10  NP               Total gym   L6, 5'  Lvl 3 8'  NP    Lvl 6    8'  Lvl 7   8'   Lvl 8    8' Lvl 8  8'    Recumbent bike  10'  10'   10'  10'  10'  10' 10'   10' 10'    stand hip abd   OTB, 3x10 ea    NP                SL ball toss- 2 direction  2x20 ea    NP                step up blue  20x ea    NP               BOSU mini squat  2x10, 5"    5" 2x15  5" 2x15    5" 2x15 5" 2x15        prone SLR  2x10, 5"  5" 2x10  NP    3" 3x10  3" 3x10 3" 3x10        prone quad sets   5" 2x10  NP                storks GTB 4-way     2x15                rockerboard     2x15  2x20             Leg press     #55 x10  #85 2x10 3x15 #85-#130  85# 3x15  55#  3x15  85# 3x15  55#  3x15 115# 3x30  85#  3x15 130# 3x30  85#   3x15 130#  3x30  85# (SLS)  3x15 160# D/L  115# S/L  3x30         2x15               SL toe tap       2x10    HOLD         Lat step down from foam       3x20  3x20  3x20         Wall squats w/ pball       2x20     3x15       clamshells         5" 2x10 5" 3x10 5"       sidelying SLR w/ ER         2x10 5" 3x10 5" 3x10       sidelying total gym         Lvl 5  3x15 Lvl 5  3x15 lvl 5  3x15 Lvl 6  3x15 Lvl 6  3x15 Lvl 6  3x15   ecc squats             2x10       Reverse slide lunge               3x10 3x10 3x10   Lunge w/ opp leg elevated               3x20 3x20 3x20   Staggered stance squats               3x10 ea 3x10 ea  3x10ea   BOSU squat stance w/ ball toss                  2'    box jumps                  4" x15   skiiers          x30   Mini heisman trophy jumps          x40          Assessment: Patient did well with all new TE as listed reports no pain during or post tx  Plan: Continue per plan of care  Attestation signed by Mansi Smyth PT at 11/29/2018  7:12 PM:  Re-evaluation:    Patient is making improvements in ADL performance and good progress toward returning to exercising for health  He will continue to benefit from therapy to return to prior level of function  Plan of care: 11/21/18-12/19/18

## 2019-07-01 ENCOUNTER — APPOINTMENT (OUTPATIENT)
Dept: GENERAL RADIOLOGY | Facility: HOSPITAL | Age: 59
End: 2019-07-01

## 2019-07-01 ENCOUNTER — APPOINTMENT (OUTPATIENT)
Dept: CT IMAGING | Facility: HOSPITAL | Age: 59
End: 2019-07-01

## 2019-07-01 ENCOUNTER — HOSPITAL ENCOUNTER (EMERGENCY)
Facility: HOSPITAL | Age: 59
Discharge: ANOTHER HOSPITAL | End: 2019-07-02
Attending: FAMILY MEDICINE | Admitting: FAMILY MEDICINE

## 2019-07-01 DIAGNOSIS — E16.2 HYPOGLYCEMIA: ICD-10-CM

## 2019-07-01 DIAGNOSIS — S06.5X9A: Primary | ICD-10-CM

## 2019-07-01 LAB
ARTERIAL PATENCY WRIST A: ABNORMAL
ATMOSPHERIC PRESS: 729 MMHG
BASE EXCESS BLDA CALC-SCNC: -8.2 MMOL/L
BASOPHILS # BLD AUTO: 0.03 10*3/MM3 (ref 0–0.2)
BASOPHILS NFR BLD AUTO: 0.4 % (ref 0–1.5)
BDY SITE: ABNORMAL
BILIRUB UR QL STRIP: NEGATIVE
BODY TEMPERATURE: 98.6 C
CLARITY UR: CLEAR
COHGB MFR BLD: 2.4 % (ref 0–5)
COLOR UR: YELLOW
DEPRECATED RDW RBC AUTO: 43.8 FL (ref 37–54)
EOSINOPHIL # BLD AUTO: 0.19 10*3/MM3 (ref 0–0.4)
EOSINOPHIL NFR BLD AUTO: 2.3 % (ref 0.3–6.2)
ERYTHROCYTE [DISTWIDTH] IN BLOOD BY AUTOMATED COUNT: 13.6 % (ref 12.3–15.4)
GLUCOSE BLDC GLUCOMTR-MCNC: 145 MG/DL (ref 70–130)
GLUCOSE UR STRIP-MCNC: ABNORMAL MG/DL
HCO3 BLDA-SCNC: 18.9 MMOL/L (ref 22–26)
HCT VFR BLD AUTO: 37.1 % (ref 37.5–51)
HCT VFR BLD CALC: 45 % (ref 42–52)
HGB BLD-MCNC: 12 G/DL (ref 13–17.7)
HGB BLDA-MCNC: 15.3 G/DL (ref 12–16)
HGB UR QL STRIP.AUTO: NEGATIVE
HOROWITZ INDEX BLD+IHG-RTO: 21 %
IMM GRANULOCYTES # BLD AUTO: 0.01 10*3/MM3 (ref 0–0.05)
IMM GRANULOCYTES NFR BLD AUTO: 0.1 % (ref 0–0.5)
KETONES UR QL STRIP: NEGATIVE
LEUKOCYTE ESTERASE UR QL STRIP.AUTO: NEGATIVE
LYMPHOCYTES # BLD AUTO: 1.96 10*3/MM3 (ref 0.7–3.1)
LYMPHOCYTES NFR BLD AUTO: 23.9 % (ref 19.6–45.3)
MCH RBC QN AUTO: 29.1 PG (ref 26.6–33)
MCHC RBC AUTO-ENTMCNC: 32.3 G/DL (ref 31.5–35.7)
MCV RBC AUTO: 90 FL (ref 79–97)
METHGB BLD QL: 0.1 % (ref 0–3)
MODALITY: ABNORMAL
MONOCYTES # BLD AUTO: 0.4 10*3/MM3 (ref 0.1–0.9)
MONOCYTES NFR BLD AUTO: 4.9 % (ref 5–12)
NEUTROPHILS # BLD AUTO: 5.6 10*3/MM3 (ref 1.7–7)
NEUTROPHILS NFR BLD AUTO: 68.4 % (ref 42.7–76)
NITRITE UR QL STRIP: NEGATIVE
OXYHGB MFR BLDV: 94.7 % (ref 85–100)
PCO2 BLDA: 44.5 MM HG (ref 35–45)
PH BLDA: 7.25 PH UNITS (ref 7.35–7.45)
PH UR STRIP.AUTO: 6.5 [PH] (ref 5–8)
PLATELET # BLD AUTO: 197 10*3/MM3 (ref 140–450)
PMV BLD AUTO: 10.9 FL (ref 6–12)
PO2 BLDA: 109 MM HG (ref 80–100)
PROT UR QL STRIP: NEGATIVE
RBC # BLD AUTO: 4.12 10*6/MM3 (ref 4.14–5.8)
SAO2 % BLDCOA: 97.1 % (ref 90–100)
SP GR UR STRIP: 1.01 (ref 1–1.03)
UROBILINOGEN UR QL STRIP: ABNORMAL
WBC NRBC COR # BLD: 8.19 10*3/MM3 (ref 3.4–10.8)

## 2019-07-01 PROCEDURE — 80053 COMPREHEN METABOLIC PANEL: CPT | Performed by: NURSE PRACTITIONER

## 2019-07-01 PROCEDURE — 83605 ASSAY OF LACTIC ACID: CPT | Performed by: NURSE PRACTITIONER

## 2019-07-01 PROCEDURE — 96361 HYDRATE IV INFUSION ADD-ON: CPT

## 2019-07-01 PROCEDURE — 82375 ASSAY CARBOXYHB QUANT: CPT | Performed by: NURSE PRACTITIONER

## 2019-07-01 PROCEDURE — 25010000002 VANCOMYCIN 5 G RECONSTITUTED SOLUTION 5,000 MG VIAL: Performed by: NURSE PRACTITIONER

## 2019-07-01 PROCEDURE — 82805 BLOOD GASES W/O2 SATURATION: CPT | Performed by: NURSE PRACTITIONER

## 2019-07-01 PROCEDURE — 71045 X-RAY EXAM CHEST 1 VIEW: CPT

## 2019-07-01 PROCEDURE — 80307 DRUG TEST PRSMV CHEM ANLYZR: CPT | Performed by: NURSE PRACTITIONER

## 2019-07-01 PROCEDURE — 93005 ELECTROCARDIOGRAM TRACING: CPT | Performed by: NURSE PRACTITIONER

## 2019-07-01 PROCEDURE — 25010000002 PIPERACILLIN-TAZOBACTAM: Performed by: NURSE PRACTITIONER

## 2019-07-01 PROCEDURE — 70450 CT HEAD/BRAIN W/O DYE: CPT

## 2019-07-01 PROCEDURE — 82550 ASSAY OF CK (CPK): CPT | Performed by: NURSE PRACTITIONER

## 2019-07-01 PROCEDURE — 36415 COLL VENOUS BLD VENIPUNCTURE: CPT

## 2019-07-01 PROCEDURE — 81003 URINALYSIS AUTO W/O SCOPE: CPT | Performed by: NURSE PRACTITIONER

## 2019-07-01 PROCEDURE — 70450 CT HEAD/BRAIN W/O DYE: CPT | Performed by: RADIOLOGY

## 2019-07-01 PROCEDURE — 36600 WITHDRAWAL OF ARTERIAL BLOOD: CPT | Performed by: NURSE PRACTITIONER

## 2019-07-01 PROCEDURE — 82962 GLUCOSE BLOOD TEST: CPT

## 2019-07-01 PROCEDURE — 84484 ASSAY OF TROPONIN QUANT: CPT | Performed by: NURSE PRACTITIONER

## 2019-07-01 PROCEDURE — 71045 X-RAY EXAM CHEST 1 VIEW: CPT | Performed by: RADIOLOGY

## 2019-07-01 PROCEDURE — 99285 EMERGENCY DEPT VISIT HI MDM: CPT

## 2019-07-01 PROCEDURE — 83050 HGB METHEMOGLOBIN QUAN: CPT | Performed by: NURSE PRACTITIONER

## 2019-07-01 PROCEDURE — 96365 THER/PROPH/DIAG IV INF INIT: CPT

## 2019-07-01 PROCEDURE — 85025 COMPLETE CBC W/AUTO DIFF WBC: CPT | Performed by: NURSE PRACTITIONER

## 2019-07-01 PROCEDURE — 87040 BLOOD CULTURE FOR BACTERIA: CPT | Performed by: NURSE PRACTITIONER

## 2019-07-01 PROCEDURE — 96367 TX/PROPH/DG ADDL SEQ IV INF: CPT

## 2019-07-01 RX ORDER — SODIUM CHLORIDE 0.9 % (FLUSH) 0.9 %
10 SYRINGE (ML) INJECTION AS NEEDED
Status: DISCONTINUED | OUTPATIENT
Start: 2019-07-01 | End: 2019-07-02 | Stop reason: HOSPADM

## 2019-07-01 RX ADMIN — PIPERACILLIN SODIUM,TAZOBACTAM SODIUM 3.38 G: 3; .375 INJECTION, POWDER, FOR SOLUTION INTRAVENOUS at 23:52

## 2019-07-01 RX ADMIN — SODIUM CHLORIDE 1000 ML: 9 INJECTION, SOLUTION INTRAVENOUS at 22:43

## 2019-07-01 RX ADMIN — SODIUM CHLORIDE 1190 ML: 9 INJECTION, SOLUTION INTRAVENOUS at 23:53

## 2019-07-02 VITALS
HEIGHT: 70 IN | TEMPERATURE: 97.5 F | DIASTOLIC BLOOD PRESSURE: 88 MMHG | SYSTOLIC BLOOD PRESSURE: 110 MMHG | OXYGEN SATURATION: 100 % | BODY MASS INDEX: 22.33 KG/M2 | WEIGHT: 156 LBS | HEART RATE: 85 BPM | RESPIRATION RATE: 20 BRPM

## 2019-07-02 LAB
6-ACETYL MORPHINE: NEGATIVE
ALBUMIN SERPL-MCNC: 3.76 G/DL (ref 3.5–5.2)
ALBUMIN/GLOB SERPL: 1.4 G/DL
ALP SERPL-CCNC: 74 U/L (ref 39–117)
ALT SERPL W P-5'-P-CCNC: 19 U/L (ref 1–41)
AMPHET+METHAMPHET UR QL: NEGATIVE
ANION GAP SERPL CALCULATED.3IONS-SCNC: 16.6 MMOL/L (ref 5–15)
AST SERPL-CCNC: 20 U/L (ref 1–40)
BARBITURATES UR QL SCN: NEGATIVE
BENZODIAZ UR QL SCN: NEGATIVE
BILIRUB SERPL-MCNC: 0.4 MG/DL (ref 0.2–1.2)
BUN BLD-MCNC: 15 MG/DL (ref 6–20)
BUN/CREAT SERPL: 15.8 (ref 7–25)
BUPRENORPHINE SERPL-MCNC: NEGATIVE NG/ML
CALCIUM SPEC-SCNC: 8.1 MG/DL (ref 8.6–10.5)
CANNABINOIDS SERPL QL: POSITIVE
CHLORIDE SERPL-SCNC: 102 MMOL/L (ref 98–107)
CK SERPL-CCNC: 206 U/L (ref 20–200)
CO2 SERPL-SCNC: 22.4 MMOL/L (ref 22–29)
COCAINE UR QL: NEGATIVE
CREAT BLD-MCNC: 0.95 MG/DL (ref 0.76–1.27)
D-LACTATE SERPL-SCNC: 5.9 MMOL/L (ref 0.5–2)
GFR SERPL CREATININE-BSD FRML MDRD: 82 ML/MIN/1.73
GLOBULIN UR ELPH-MCNC: 2.6 GM/DL
GLUCOSE BLD-MCNC: 133 MG/DL (ref 65–99)
GLUCOSE BLDC GLUCOMTR-MCNC: 129 MG/DL (ref 70–130)
GLUCOSE BLDC GLUCOMTR-MCNC: 286 MG/DL (ref 70–130)
HOLD SPECIMEN: NORMAL
METHADONE UR QL SCN: NEGATIVE
OPIATES UR QL: NEGATIVE
OXYCODONE UR QL SCN: NEGATIVE
PCP UR QL SCN: NEGATIVE
POTASSIUM BLD-SCNC: 3.2 MMOL/L (ref 3.5–5.2)
PROT SERPL-MCNC: 6.4 G/DL (ref 6–8.5)
SODIUM BLD-SCNC: 141 MMOL/L (ref 136–145)
TROPONIN T SERPL-MCNC: <0.01 NG/ML (ref 0–0.03)

## 2019-07-02 PROCEDURE — 25010000002 VANCOMYCIN 5 G RECONSTITUTED SOLUTION 5,000 MG VIAL: Performed by: NURSE PRACTITIONER

## 2019-07-02 PROCEDURE — 82962 GLUCOSE BLOOD TEST: CPT

## 2019-07-02 PROCEDURE — 96367 TX/PROPH/DG ADDL SEQ IV INF: CPT

## 2019-07-02 RX ADMIN — VANCOMYCIN HYDROCHLORIDE 1500 MG: 5 INJECTION, POWDER, LYOPHILIZED, FOR SOLUTION INTRAVENOUS at 00:25

## 2019-07-02 NOTE — ED NOTES
Provider at bedside at this time updating pt and family on results at this time, all questions and concerns addressed, understanding verbalized.     Ella Trejo RN  07/02/19 0021

## 2019-07-02 NOTE — ED NOTES
Pt , provider made aware.   Orange falls risk band applied.      Sol Chavarria RN  07/01/19 [id number]

## 2019-07-02 NOTE — ED NOTES
[facility] MD spoke with Adwoa requesting to speak with trama coordinator. Chad Jacob speaking with [facility] Trauma Coordinator at this time.     Areli Hui  07/02/19 0028

## 2019-07-02 NOTE — ED NOTES
"Pt states he remembers coming into his house at approx 3-4 pm to get something to eat due to his \"sugar being low\", pt states the next thing he recollects is waking up in the ED.  Per EMS, pt was found lying on his kitchen floor, unresponsive with a glucose in the 40s.  Pt presents with a abrasion to the back of his head, pt states he does take blood thinners.     Ella Trejo RN  07/02/19 0346    "

## 2019-07-02 NOTE — ED NOTES
Pt given peanut butter and crackers and milk at this time, pts family assisting pt with eating.     Ella Trejo RN  07/01/19 1115

## 2019-07-02 NOTE — ED PROVIDER NOTES
Subjective   Patient is a 58-year-old male that presents to the ED per EMS for change in mental status and hypoglycemia.  EMS was called by the brother who said that he found the patient unresponsive.  Upon EMS arrival the blood glucose was 43 mg/dL.  He was given D50 prior to arrival and glucose went up to 203 mg/dL. The patient has decreased responsiveness. His last known well time was 1530.        History provided by:  Patient   used: No    Altered Mental Status   Presenting symptoms: behavior changes and confusion    Severity:  Moderate  Most recent episode:  Today  Progression:  Unchanged  Chronicity:  New  Associated symptoms: slurred speech        Review of Systems   Reason unable to perform ROS: limited related to decreased verbal response.   Constitutional: Positive for diaphoresis and fatigue.   Psychiatric/Behavioral: Positive for behavioral problems, confusion and decreased concentration.   All other systems reviewed and are negative.      Past Medical History:   Diagnosis Date   • Diabetes mellitus (CMS/HCC)    • Thyroid nodule        No Known Allergies    Past Surgical History:   Procedure Laterality Date   • APPENDECTOMY         Family History   Family history unknown: Yes       Social History     Socioeconomic History   • Marital status:      Spouse name: Not on file   • Number of children: Not on file   • Years of education: Not on file   • Highest education level: Not on file   Tobacco Use   • Smoking status: Former Smoker   • Smokeless tobacco: Current User     Types: Snuff   Substance and Sexual Activity   • Alcohol use: Yes     Comment: Last use - today   • Drug use: No   • Sexual activity: Defer           Objective   Physical Exam   Constitutional: He appears lethargic.   Appears thin and frail and is diaphoretic   HENT:   Head: Normocephalic.   Eyes:   Pupils constricted   Neck: Normal range of motion. Neck supple.   Cardiovascular: Normal rate, regular rhythm,  normal heart sounds and intact distal pulses.   Pulmonary/Chest:   Scattered rhonchi throughout and right upper lobe   Abdominal: Soft. Bowel sounds are normal.   Neurological: He appears lethargic.   Skin: Capillary refill takes 2 to 3 seconds. He is diaphoretic.   Cool, diaphoretic   Nursing note and vitals reviewed.      Procedures           ED Course  ED Course as of Jul 24 0335   Tue Jul 02, 2019   0030 Patient is now alert and oriented, denies complaint  [KK]   0034 Pt seen and evaluated with NP agree with treatment and plan. Also discussed with [facility] who see subdural on patient head CT  []      ED Course User Index  [KK] Chad Jacob, IDALIA  [] Nicole Garrison,                   MDM  Number of Diagnoses or Management Options  Hypoglycemia: new and requires workup  Subdural hematoma without coma with loss of consciousness, initial encounter (CMS/HCC): new and requires workup     Amount and/or Complexity of Data Reviewed  Clinical lab tests: ordered and reviewed  Tests in the radiology section of CPT®: reviewed and ordered  Tests in the medicine section of CPT®: ordered and reviewed    Risk of Complications, Morbidity, and/or Mortality  Presenting problems: moderate  Diagnostic procedures: moderate  Management options: moderate    Patient Progress  Patient progress: stable        Final diagnoses:   Subdural hematoma without coma with loss of consciousness, initial encounter (CMS/HCC)   Hypoglycemia            Chad Jacob APRN  07/02/19 0033       Chad Jacob, IDALIA  07/24/19 0336

## 2019-07-02 NOTE — ED NOTES
Called [facility] dispatch requesting transport to  ED Spoke with Evonne. Per [facility] Transport accepted and ETA 30 Mins Provider aware.     Areli Hui  07/02/19 0036

## 2019-07-06 LAB
BACTERIA SPEC AEROBE CULT: NORMAL
BACTERIA SPEC AEROBE CULT: NORMAL

## 2019-10-29 ENCOUNTER — TRANSCRIBE ORDERS (OUTPATIENT)
Dept: ADMINISTRATIVE | Facility: HOSPITAL | Age: 59
End: 2019-10-29

## 2019-10-29 DIAGNOSIS — L89.90 PRESSURE INJURY OF SKIN, UNSPECIFIED INJURY STAGE, UNSPECIFIED LOCATION: Primary | ICD-10-CM

## 2019-11-06 ENCOUNTER — APPOINTMENT (OUTPATIENT)
Dept: WOUND CARE | Facility: HOSPITAL | Age: 59
End: 2019-11-06

## 2021-03-15 ENCOUNTER — IMMUNIZATION (OUTPATIENT)
Dept: VACCINE CLINIC | Facility: HOSPITAL | Age: 61
End: 2021-03-15

## 2021-03-15 PROCEDURE — 91300 HC SARSCOV02 VAC 30MCG/0.3ML IM: CPT | Performed by: INTERNAL MEDICINE

## 2021-03-15 PROCEDURE — 0001A: CPT | Performed by: INTERNAL MEDICINE

## 2021-04-05 ENCOUNTER — IMMUNIZATION (OUTPATIENT)
Dept: VACCINE CLINIC | Facility: HOSPITAL | Age: 61
End: 2021-04-05

## 2021-04-05 PROCEDURE — 0002A: CPT | Performed by: INTERNAL MEDICINE

## 2021-04-05 PROCEDURE — 91300 HC SARSCOV02 VAC 30MCG/0.3ML IM: CPT | Performed by: INTERNAL MEDICINE

## 2021-11-05 ENCOUNTER — IMMUNIZATION (OUTPATIENT)
Dept: VACCINE CLINIC | Facility: HOSPITAL | Age: 61
End: 2021-11-05

## 2021-11-05 PROCEDURE — 91300 HC SARSCOV02 VAC 30MCG/0.3ML IM: CPT | Performed by: INTERNAL MEDICINE

## 2021-11-05 PROCEDURE — 0004A ADM SARSCOV2 30MCG/0.3ML BOOSTER: CPT | Performed by: INTERNAL MEDICINE

## 2023-09-07 ENCOUNTER — RA CDI HCC (OUTPATIENT)
Dept: OTHER | Facility: HOSPITAL | Age: 63
End: 2023-09-07

## 2023-09-07 NOTE — PROGRESS NOTES
[address] [facility] coding opportunities       Chart reviewed, no opportunity found: CHART REVIEWED, NO OPPORTUNITY FOUND     Patients Insurance     Commercial Insurance: Gareth Martínez

## 2023-09-13 ENCOUNTER — OFFICE VISIT (OUTPATIENT)
Dept: INTERNAL MEDICINE CLINIC | Facility: CLINIC | Age: 63
End: 2023-09-13
Payer: COMMERCIAL

## 2023-09-13 VITALS
WEIGHT: 235.6 LBS | BODY MASS INDEX: 34.9 KG/M2 | SYSTOLIC BLOOD PRESSURE: 138 MMHG | DIASTOLIC BLOOD PRESSURE: 88 MMHG | HEART RATE: 64 BPM | HEIGHT: 69 IN | OXYGEN SATURATION: 97 % | RESPIRATION RATE: 16 BRPM

## 2023-09-13 DIAGNOSIS — I10 ESSENTIAL HYPERTENSION: ICD-10-CM

## 2023-09-13 DIAGNOSIS — Z99.89 OSA ON CPAP: ICD-10-CM

## 2023-09-13 DIAGNOSIS — C61 ADENOCARCINOMA OF PROSTATE (HCC): ICD-10-CM

## 2023-09-13 DIAGNOSIS — F41.9 ANXIETY: ICD-10-CM

## 2023-09-13 DIAGNOSIS — Z76.89 ENCOUNTER TO ESTABLISH CARE: Primary | ICD-10-CM

## 2023-09-13 DIAGNOSIS — G47.33 OSA ON CPAP: ICD-10-CM

## 2023-09-13 DIAGNOSIS — F41.0 PANIC ATTACKS: ICD-10-CM

## 2023-09-13 DIAGNOSIS — N20.0 NEPHROLITHIASIS: ICD-10-CM

## 2023-09-13 PROBLEM — R82.992 HYPEROXALURIA: Status: ACTIVE | Noted: 2023-03-03

## 2023-09-13 PROBLEM — M94.20 CHONDROMALACIA: Status: ACTIVE | Noted: 2023-09-13

## 2023-09-13 PROBLEM — S83.249A TEAR OF MEDIAL MENISCUS OF KNEE: Status: ACTIVE | Noted: 2023-09-13

## 2023-09-13 PROBLEM — R05.9 COUGH: Status: RESOLVED | Noted: 2018-02-19 | Resolved: 2023-09-13

## 2023-09-13 PROCEDURE — 99204 OFFICE O/P NEW MOD 45 MIN: CPT | Performed by: INTERNAL MEDICINE

## 2023-09-13 RX ORDER — FLUOXETINE 10 MG/1
10 CAPSULE ORAL DAILY
Qty: 60 CAPSULE | Refills: 0 | Status: SHIPPED | OUTPATIENT
Start: 2023-09-13

## 2023-09-13 RX ORDER — ALPRAZOLAM 0.25 MG/1
0.25 TABLET ORAL
Qty: 14 TABLET | Refills: 0 | Status: SHIPPED | OUTPATIENT
Start: 2023-09-13

## 2023-09-13 NOTE — PROGRESS NOTES
Name: Delena Canavan      : 1960      MRN: [id number]  Encounter Provider: Spenser Ma MD  Encounter Date: 2023   Encounter department: [facility]     1. Encounter to establish care    2. LISA on CPAP  -     Split Study; Future    3. Panic attacks  -     FLUoxetine (PROzac) 10 mg capsule; Take 1 capsule (10 mg total) by mouth daily  -     ALPRAZolam (XANAX) 0.25 mg tablet; Take 1 tablet (0.25 mg total) by mouth daily at bedtime as needed for anxiety    4. Anxiety  Assessment & Plan:  -Start Prozac for psoriasis that we can medically be associated with it.  -I have also given him a prescription for Xanax as a bridge should he suffer any panic attacks.  -Encouraged him to schedule follow-up with a counselor. Orders:  -     FLUoxetine (PROzac) 10 mg capsule; Take 1 capsule (10 mg total) by mouth daily  -     ALPRAZolam (XANAX) 0.25 mg tablet; Take 1 tablet (0.25 mg total) by mouth daily at bedtime as needed for anxiety    5. Adenocarcinoma of prostate [facility])  Assessment & Plan:  -Status post robotic assisted total prostatectomy  -Followed by urology at [facility]      6. Essential hypertension  Assessment & Plan:  -Blood pressure controlled   -Off of all antihypertensives since losing weight      7.  Nephrolithiasis  Assessment & Plan:  -History of recurrent nephrolithiasis  -Followed by nephrology               From [facility]:  Status Date Due Completion Date   COVID-19 Vaccine (4 - Booster for Moderna series) Overdue 02/15/2022 2021   Lipid Screen Overdue 2022   Colorectal Cancer Screening Due Soon 2023   Preventative Care Visit (43-63 y.o.) Not Due 2023   Depression Monitoring Not Due 2023   Pneumococcal Vaccine At Risk 19-64 (2 - PCV) Not Due 2023   Tetanus Not Due 2032 5/3/2022   HIV Screen Completed Completed 6/15/2013   Hepatitis C Screening Completed Completed 4/1/2017   Zoster Vaccine Completed Completed 4/2/2021   Influenza Vaccine* Completed Completed 12/6/2022          Subjective     HPI  Patient presents today to establish care. He is a prior patient of mine followed by former practice. He is a retired . His past medical history is significant for anxiety and depression. He reports recently over the past several weeks he has been experiencing an increase in anxiety as well as panic attacks. 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 He states triggers for him includes crowds, driving complications. He was previously tried Celexa and Wellbutrin in the past, both of which were discontinued due to side effects. All other systems negative except for pertinent findings noted in HPI. Past Medical History:   Diagnosis Date   • Depression    • GERD (gastroesophageal reflux disease)    • History of kidney stones     kidney stones   • LISA on CPAP      Past Surgical History:   Procedure Laterality Date   • APPENDECTOMY     • COLONOSCOPY  02/19/2013    slight internal non-bleed hemorroids in anal canal. no specimens.  Dr. Andrew Fernandez     • MENISCECTOMY Left 2018   • PROSTATECTOMY       Family History   Problem Relation Age of Onset   • Skin cancer Mother    • Macular degeneration Mother    • Diabetes Father    • Heart attack Maternal Grandmother    • Hypertension Maternal Grandmother    • Lung disease Neg Hx      Social History     Socioeconomic History   • Marital status: /Civil Union     Spouse name: None   • Number of children: None   • Years of education: None   • Highest education level: None   Occupational History   • Occupation:    Tobacco Use   • Smoking status: Some Days     Types: Cigars   • Smokeless tobacco: Never   • Tobacco comments:     Smokes 3 to 5 cigars a week    Vaping Use   • Vaping Use: Never used   Substance and Sexual Activity   • Alcohol use: Not Currently     Alcohol/week: 1.0 - 2.0 standard drink of alcohol     Types: 1 - 2 Standard drinks or equivalent per week   • Drug use: No   • Sexual activity: None   Other Topics Concern   • None   Social History Narrative   • None     Social Determinants of Health     Financial Resource Strain: Not on file   Food Insecurity: Not on file   Transportation Needs: Not on file   Physical Activity: Not on file   Stress: Not on file   Social Connections: Not on file   Intimate Partner Violence: Not on file   Housing Stability: Not on file     Current Outpatient Medications on File Prior to Visit   Medication Sig   • Ascorbic Acid, Vitamin C, (VITAMIN C) 100 MG tablet Take 100 mg by mouth   • Cholecalciferol (VITAMIN D3 PO) Take by mouth   • Multiple Vitamins-Minerals (ZINC PO) Take by mouth   • Omega-3 Fatty Acids (FISH OIL PO) Take by mouth   • Red Yeast Rice Extract (RED YEAST RICE PO) Take by mouth   • sildenafil (REVATIO) 20 mg tablet      Allergies   Allergen Reactions   • Citalopram    • Wellbutrin Xl [Bupropion Hcl Er (Xl)]      Immunization History   Administered Date(s) Administered   • COVID-19 MODERNA VACC 0.5 ML IM 02/05/2021, 03/05/2021, 12/21/2021   • INFLUENZA 12/08/2005, 09/24/2009, 09/23/2010, 10/29/2012, 01/20/2015, 10/08/2015, 10/17/2016, 12/01/2016, 10/16/2017, 10/19/2018, 10/22/2019, 10/08/2020, 11/26/2021, 12/06/2022   • Pneumococcal Polysaccharide PPV23 11/23/2020, 12/06/2022   • Td (adult), Unspecified 01/01/2012   • Tdap 05/03/2022   • Zoster 11/19/2020, 04/02/2021   • Zoster Vaccine Recombinant 11/19/2020, 04/02/2021       Objective     /88   Pulse 64   Resp 16   Ht 5' 9" (1.753 m)   Wt 107 kg (235 lb 9.6 oz)   SpO2 97%   BMI 34.79 kg/m²     BP Readings from Last 3 Encounters:   09/13/23 138/88   08/21/23 116/68   05/09/18 122/88        Wt Readings from Last 3 Encounters:   09/13/23 107 kg (235 lb 9.6 oz)   08/21/23 107 kg (235 lb)   05/09/18 120 kg (265 lb) Physical Exam    General: NAD, Alert and oriented x3   HEENT: NCAT, EOMI, normal conjunctiva  Cardiovascular: RRR, normal S1 and S2, no m/r/g  Pulmonary: Normal respiratory effort, no wheezes, rales or rhonchi  GI: Soft, nontender, nondistended, normoactive bowel sounds  MSK: Normal bulk and tone  Neuro: Non-focal, ambulating without difficulty, non-antalgic gait  Extremities: No lower extremity edema  Skin: Normal skin color, no rashes     Dylan Reveles MD

## 2023-09-15 PROBLEM — F41.9 ANXIETY: Status: ACTIVE | Noted: 2023-09-15

## 2023-09-15 PROBLEM — F41.0 PANIC ATTACKS: Status: ACTIVE | Noted: 2023-09-15

## 2023-09-15 NOTE — ASSESSMENT & PLAN NOTE
-Start Prozac for psoriasis that we can medically be associated with it.  -I have also given him a prescription for Xanax as a bridge should he suffer any panic attacks.  -Encouraged him to schedule follow-up with a counselor.

## 2023-10-10 ENCOUNTER — TELEPHONE (OUTPATIENT)
Dept: INTERNAL MEDICINE CLINIC | Facility: CLINIC | Age: 63
End: 2023-10-10

## 2023-10-10 NOTE — TELEPHONE ENCOUNTER
Patient stopped in today, he was made aware you out of office ok to wait until you back in to address concern. He had gone to [facility] because he though you wanted him to do follow up lab work after starting him on new medications (prozac, xanax). There are no orders entered. He has a virtual follow up appointment with you next week to review how he is doing with his new meds. Maybe he misunderstood he reports because he though you wanted to have labs prior to f/u.      Please review and advise

## 2023-10-11 ENCOUNTER — TELEPHONE (OUTPATIENT)
Dept: SLEEP CENTER | Facility: CLINIC | Age: 63
End: 2023-10-11

## 2023-10-11 NOTE — TELEPHONE ENCOUNTER
----- Message from Lisa Tay MD sent at 10/10/2023  7:41 PM EDT -----  approved  ----- Message -----  From: Danna Souza  Sent: 9/18/2023  10:56 AM EDT  To: Sleep Medicine [facility] Provider    This split study sleep study needs approval.     If approved please sign and return to clerical pool. If denied please include reasons why. Also provide alternative testing if warranted. Please sign and return to clerical pool.

## 2023-10-18 ENCOUNTER — TELEMEDICINE (OUTPATIENT)
Dept: INTERNAL MEDICINE CLINIC | Facility: CLINIC | Age: 63
End: 2023-10-18
Payer: COMMERCIAL

## 2023-10-18 DIAGNOSIS — F41.9 ANXIETY: Primary | ICD-10-CM

## 2023-10-18 PROCEDURE — 99441 PR PHYS/QHP TELEPHONE EVALUATION 5-10 MIN: CPT | Performed by: INTERNAL MEDICINE

## 2023-10-18 NOTE — PROGRESS NOTES
Virtual Brief Visit    This Visit is being completed by telephone. The Patient is located at Home and in the following state in which I hold an active license [place]    The patient was identified by name and date of birth. Roslyn Quinones was informed that this is a telemedicine visit and that the visit is being conducted through the [address] [place] [address] [email or web address] platform. He agrees to proceed. .  My office door was closed. No one else was in the room. He acknowledged consent and understanding of privacy and security of the video platform. The patient has agreed to participate and understands they can discontinue the visit at any time. Patient is aware this is a billable service. Patient presents today for follow-up of his anxiety. Overall he notes he has been a significant improvement in his anxiety on fluoxetine 10 mg daily. He states his wife also notices a difference. Since starting fluoxetine he has only required his Xanax on 3 occasions. He denies any significant medication side effects. For now he would like to stay at his current dose. Assessment/Plan:    Problem List Items Addressed This Visit     Anxiety - Primary     -Improved   -Continue Fluoxetine 10mg daily   -Xanax prn   -Schedule f/u in 3 months               Recent Visits  No visits were found meeting these conditions. Showing recent visits within past 7 days and meeting all other requirements  Today's Visits  Date Type Provider Dept   10/18/23 Telemedicine Dylan Kam, [address] today's visits and meeting all other requirements  Future Appointments  No visits were found meeting these conditions.   Showing future appointments within next 150 days and meeting all other requirements         Visit Time  Total Visit Duration: 7 minutes

## 2023-10-26 DIAGNOSIS — F41.0 PANIC ATTACKS: ICD-10-CM

## 2023-10-26 DIAGNOSIS — F41.9 ANXIETY: ICD-10-CM

## 2023-10-26 RX ORDER — FLUOXETINE 10 MG/1
10 CAPSULE ORAL DAILY
Qty: 60 CAPSULE | Refills: 0 | Status: SHIPPED | OUTPATIENT
Start: 2023-10-26

## 2023-10-26 RX ORDER — ALPRAZOLAM 0.25 MG/1
0.25 TABLET ORAL
Qty: 14 TABLET | Refills: 0 | Status: SHIPPED | OUTPATIENT
Start: 2023-10-26

## 2023-10-26 NOTE — TELEPHONE ENCOUNTER
Reason for call:   [x] Refill   [] Prior Auth  [] Other:     Office:   [x] PCP/Provider - Mark Anthony Weldon    [] Specialty/Provider -     Medication: Alprazolam 0.25mg Qty 14                     Fluoxetine 10mg Qty 60    Dose/Frequency:     Quantity:     Pharmacy: [facility]    Does the patient have enough for 3 days?    [x] Yes   [] No - Send as HP to POD

## 2023-11-15 ENCOUNTER — TRANSCRIBE ORDERS (OUTPATIENT)
Dept: ADMINISTRATIVE | Facility: HOSPITAL | Age: 63
End: 2023-11-15
Payer: MEDICARE

## 2023-11-15 DIAGNOSIS — Z87.891 PERSONAL HISTORY OF TOBACCO USE, PRESENTING HAZARDS TO HEALTH: Primary | ICD-10-CM

## 2024-01-02 DIAGNOSIS — F41.9 ANXIETY: ICD-10-CM

## 2024-01-02 DIAGNOSIS — F41.0 PANIC ATTACKS: ICD-10-CM

## 2024-01-02 RX ORDER — FLUOXETINE 10 MG/1
10 CAPSULE ORAL DAILY
Qty: 90 CAPSULE | Refills: 1 | Status: SHIPPED | OUTPATIENT
Start: 2024-01-02

## 2024-01-02 RX ORDER — ALPRAZOLAM 0.25 MG/1
0.25 TABLET ORAL
Qty: 14 TABLET | Refills: 0 | Status: SHIPPED | OUTPATIENT
Start: 2024-01-02

## 2024-01-02 NOTE — TELEPHONE ENCOUNTER
Pt requesting both fluoxetine (requested earlier today from pharmacy) and alprazolam.    Reason for call:   [x] Refill   [] Prior Auth  [] Other:     Office:   [x] PCP/Provider - [facility]  [] Specialty/Provider -     Medication: alprazolam    Dose/Frequency: 0.25mg qhs prn    Quantity: 14    Pharmacy:  [id number] [place] [place] [facility] [address]     Does the patient have enough for 3 days?   [] Yes   [x] No - Send as HP to POD

## 2024-01-18 ENCOUNTER — RA CDI HCC (OUTPATIENT)
Dept: OTHER | Facility: HOSPITAL | Age: 64
End: 2024-01-18

## 2024-01-31 ENCOUNTER — OFFICE VISIT (OUTPATIENT)
Dept: INTERNAL MEDICINE CLINIC | Facility: CLINIC | Age: 64
End: 2024-01-31
Payer: COMMERCIAL

## 2024-01-31 VITALS
HEART RATE: 75 BPM | HEIGHT: 69 IN | OXYGEN SATURATION: 98 % | WEIGHT: 233 LBS | DIASTOLIC BLOOD PRESSURE: 82 MMHG | SYSTOLIC BLOOD PRESSURE: 130 MMHG | BODY MASS INDEX: 34.51 KG/M2

## 2024-01-31 DIAGNOSIS — I10 ESSENTIAL HYPERTENSION: ICD-10-CM

## 2024-01-31 DIAGNOSIS — F41.9 ANXIETY: ICD-10-CM

## 2024-01-31 DIAGNOSIS — Z00.00 ANNUAL PHYSICAL EXAM: Primary | ICD-10-CM

## 2024-01-31 DIAGNOSIS — F41.0 PANIC ATTACKS: ICD-10-CM

## 2024-01-31 DIAGNOSIS — F32.1 CURRENT MODERATE EPISODE OF MAJOR DEPRESSIVE DISORDER WITHOUT PRIOR EPISODE (HCC): ICD-10-CM

## 2024-01-31 DIAGNOSIS — C61 ADENOCARCINOMA OF PROSTATE (HCC): ICD-10-CM

## 2024-01-31 DIAGNOSIS — G47.33 OSA ON CPAP: ICD-10-CM

## 2024-01-31 DIAGNOSIS — Z13.6 SCREENING FOR CARDIOVASCULAR CONDITION: ICD-10-CM

## 2024-01-31 DIAGNOSIS — G47.00 INSOMNIA, UNSPECIFIED TYPE: ICD-10-CM

## 2024-01-31 PROCEDURE — 99214 OFFICE O/P EST MOD 30 MIN: CPT | Performed by: INTERNAL MEDICINE

## 2024-01-31 PROCEDURE — 99396 PREV VISIT EST AGE 40-64: CPT | Performed by: INTERNAL MEDICINE

## 2024-01-31 RX ORDER — TRAZODONE HYDROCHLORIDE 50 MG/1
50 TABLET ORAL
Qty: 30 TABLET | Refills: 0 | Status: SHIPPED | OUTPATIENT
Start: 2024-01-31

## 2024-01-31 RX ORDER — FLUOXETINE HYDROCHLORIDE 20 MG/1
20 CAPSULE ORAL DAILY
Qty: 90 CAPSULE | Refills: 1 | Status: SHIPPED | OUTPATIENT
Start: 2024-01-31

## 2024-01-31 NOTE — PROGRESS NOTES
ADULT ANNUAL PHYSICAL  [facility]    NAME: Raul Phelan  AGE: 63 y.o. SEX: male  : 1960     DATE: 2024     Assessment and Plan:     Problem List Items Addressed This Visit     LISA on CPAP     - Compliant with CPAP         Essential hypertension     -Blood pressure well controlled  -Continue current antihypertensive regimen           Relevant Orders    Comprehensive metabolic panel    Current moderate episode of major depressive disorder without prior episode (HCC)     -Stable         Relevant Medications    FLUoxetine (PROzac) 20 mg capsule    traZODone (DESYREL) 50 mg tablet    Adenocarcinoma of prostate (HCC)     -Status post robotic assisted total prostatectomy  -Ultrasensitive PSA ordered  -Continue follow-up as scheduled with urology         Relevant Orders    PSA, ultrasensitive    Panic attacks     -Controlled         Relevant Medications    FLUoxetine (PROzac) 20 mg capsule    Anxiety     -Acute worsening of chronic anxiety due to work stress  -Increase fluoxetine to 20 mg daily for better anxiety control         Relevant Medications    FLUoxetine (PROzac) 20 mg capsule    Insomnia     -Patient will be given a trial of trazodone 50 mg nightly as needed         Relevant Medications    traZODone (DESYREL) 50 mg tablet   Other Visit Diagnoses     Annual physical exam    -  Primary    Screening for cardiovascular condition        Relevant Orders    Lipid Panel with Direct LDL reflex          Immunizations and preventive care screenings were discussed with patient today. Appropriate education was printed on patient's after visit summary.         Return in about 6 months (around 2024).     Chief Complaint:     Chief Complaint   Patient presents with   • Physical Exam      History of Present Illness:     Adult Annual Physical   Patient here for a comprehensive physical exam.  Since the patient's last visit he notes his anxiety has  worsened.  He attributes some of this to work stress.  He is currently on fluoxetine 10 mg daily but is asking if this can be increased to 20 mg daily.    He also complains of difficulty sleeping at night.  He is interested in trying a prescription sleep medication but states he does not want to be on anything that will cause him to become dependent upon it for sleep.     Depression Screening  PHQ-2/9 Depression Screening            Review of Systems:     Review of Systems  All other systems negative except for pertinent findings noted in HPI.      Past Medical History:     Past Medical History:   Diagnosis Date   • Depression    • GERD (gastroesophageal reflux disease)    • History of kidney stones     kidney stones   • LISA on CPAP       Past Surgical History:     Past Surgical History:   Procedure Laterality Date   • APPENDECTOMY     • COLONOSCOPY  02/19/2013    slight internal non-bleed hemorroids in anal canal. no specimens. Dr. Yuan   • HERNIA REPAIR     • MENISCECTOMY Left 2018   • PROSTATECTOMY        Family History:     Family History   Problem Relation Age of Onset   • Skin cancer Mother    • Macular degeneration Mother    • Diabetes Father    • Heart attack Maternal Grandmother    • Hypertension Maternal Grandmother    • Lung disease Neg Hx       Social History:     Social History     Socioeconomic History   • Marital status: /Civil Union     Spouse name: None   • Number of children: None   • Years of education: None   • Highest education level: None   Occupational History   • Occupation:    Tobacco Use   • Smoking status: Some Days     Types: Cigars   • Smokeless tobacco: Never   • Tobacco comments:     Smokes 3 to 5 cigars a week    Vaping Use   • Vaping status: Never Used   Substance and Sexual Activity   • Alcohol use: Not Currently     Alcohol/week: 1.0 - 2.0 standard drink of alcohol     Types: 1 - 2 Standard drinks or equivalent per week   • Drug use: No   • Sexual activity:  "None   Other Topics Concern   • None   Social History Narrative   • None     Social Determinants of Health     Financial Resource Strain: Not on file   Food Insecurity: Not on file   Transportation Needs: Not on file   Physical Activity: Not on file   Stress: Not on file   Social Connections: Not on file   Intimate Partner Violence: Not on file   Housing Stability: Not on file      Current Medications:     Current Outpatient Medications   Medication Sig Dispense Refill   • ALPRAZolam (XANAX) 0.25 mg tablet Take 1 tablet (0.25 mg total) by mouth daily at bedtime as needed for anxiety 14 tablet 0   • Ascorbic Acid, Vitamin C, (VITAMIN C) 100 MG tablet Take 100 mg by mouth     • Cholecalciferol (VITAMIN D3 PO) Take by mouth     • FLUoxetine (PROzac) 20 mg capsule Take 1 capsule (20 mg total) by mouth daily 90 capsule 1   • Multiple Vitamins-Minerals (ZINC PO) Take by mouth     • Na Sulfate-K Sulfate-Mg Sulf 17.5-3.13-1.6 GM/177ML SOLN Take 1 kit by mouth see administration instructions Follow instructions given at office 354 mL 0   • Omega-3 Fatty Acids (FISH OIL PO) Take by mouth     • Red Yeast Rice Extract (RED YEAST RICE PO) Take by mouth     • sildenafil (REVATIO) 20 mg tablet      • traZODone (DESYREL) 50 mg tablet Take 1 tablet (50 mg total) by mouth daily at bedtime as needed for sleep 30 tablet 0     No current facility-administered medications for this visit.      Allergies:     Allergies   Allergen Reactions   • Citalopram    • Wellbutrin Xl [Bupropion Hcl Er (Xl)]       Physical Exam:     /82 (BP Location: Left arm, Patient Position: Sitting, Cuff Size: Large)   Pulse 75   Ht 5' 9\" (1.753 m)   Wt 106 kg (233 lb)   SpO2 98%   BMI 34.41 kg/m²     Physical Exam   General: NAD  HEENT: NCAT, EOMI, normal conjunctiva  Cardiovascular: RRR, normal S1 and S2, no m/r/g  Pulmonary: Normal respiratory effort, no wheezes, rales or rhonchi  GI: Soft, nontender, nondistended, normoactive bowel " sounds  Musculoskeletal: Normal bulk and tone  Neuro: Non-focal, ambulating without difficulty, non-antalgic gait  Extremities: No lower extremity edema  Skin: Normal skin color, no rashes   Psychiatric: Normal mood and affect    Dylan Da Silva MD  [facility]

## 2024-01-31 NOTE — ASSESSMENT & PLAN NOTE
-Status post robotic assisted total prostatectomy  -Ultrasensitive PSA ordered  -Continue follow-up as scheduled with urology

## 2024-01-31 NOTE — ASSESSMENT & PLAN NOTE
-Acute worsening of chronic anxiety due to work stress  -Increase fluoxetine to 20 mg daily for better anxiety control

## 2024-02-08 ENCOUNTER — TELEPHONE (OUTPATIENT)
Dept: INTERNAL MEDICINE CLINIC | Facility: CLINIC | Age: 64
End: 2024-02-08

## 2024-02-08 ENCOUNTER — HOSPITAL ENCOUNTER (OUTPATIENT)
Dept: RADIOLOGY | Facility: HOSPITAL | Age: 64
Discharge: HOME | End: 2024-02-08
Payer: COMMERCIAL

## 2024-02-08 ENCOUNTER — OFFICE VISIT (OUTPATIENT)
Dept: INTERNAL MEDICINE CLINIC | Facility: CLINIC | Age: 64
End: 2024-02-08
Payer: COMMERCIAL

## 2024-02-08 VITALS
SYSTOLIC BLOOD PRESSURE: 152 MMHG | BODY MASS INDEX: 34.44 KG/M2 | HEART RATE: 72 BPM | WEIGHT: 233.2 LBS | OXYGEN SATURATION: 96 % | DIASTOLIC BLOOD PRESSURE: 86 MMHG

## 2024-02-08 DIAGNOSIS — M54.12 CERVICAL RADICULOPATHY: ICD-10-CM

## 2024-02-08 DIAGNOSIS — G47.00 INSOMNIA, UNSPECIFIED TYPE: ICD-10-CM

## 2024-02-08 DIAGNOSIS — R29.898 ARM HEAVINESS: ICD-10-CM

## 2024-02-08 DIAGNOSIS — I10 ESSENTIAL HYPERTENSION: Primary | ICD-10-CM

## 2024-02-08 PROCEDURE — 93000 ELECTROCARDIOGRAM COMPLETE: CPT | Performed by: INTERNAL MEDICINE

## 2024-02-08 PROCEDURE — 72050 X-RAY EXAM NECK SPINE 4/5VWS: CPT

## 2024-02-08 PROCEDURE — 99214 OFFICE O/P EST MOD 30 MIN: CPT | Performed by: INTERNAL MEDICINE

## 2024-02-08 NOTE — TELEPHONE ENCOUNTER
The patient reports intermittent arm numbness for several months.  Today it began again and he thought he should call.  The patient reports no back, chest, neck, shortness of breath or dizziness.  I reviewed with Dr. Da Silva, the patient is scheduled for today at 3 PM..

## 2024-02-08 NOTE — PROGRESS NOTES
Name: Raul Phelan      : 1960      MRN: [id number]  Encounter Provider: Dylan Da Silva MD  Encounter Date: 2024   Enc[facility] department: [facility]    Assessment & Plan     1. Essential hypertension  Assessment & Plan:  -Checked POC EKG  -Tracing revealed NSR with no dynamic ST segment changes or T-wave inversions     Orders:  -     POCT ECG    2. Cervical radiculopathy  Assessment & Plan:  -I suspect the patient's left arm heaviness and pain is related to nerve impingement in his neck.  His symptoms have gotten progressively worse and are now persistent over the past 2 weeks.  This is occurring in the setting of what appears to be significant degenerative cervical spine disease. 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     Orders:  -     XR spine cervical complete 4 or 5 vw non injury; Future; Expected date: 2024  -     MRI cervical spine wo contrast; Future; Expected date: 2024    3. Arm heaviness  -     XR spine cervical complete 4 or 5 vw non injury; Future; Expected date: 2024  -     MRI cervical spine wo contrast; Future; Expected date: 2024    4. Insomnia, unspecified type  Assessment & Plan:  -Patient states 50mg dose is ineffective. Instructed him to increase dose to 100mg qhs prn.            Subjective      HPI  Patient presents today as an acute visit.  He complains of left arm pain and heaviness that started approximately 2 months ago.  He notes initially his symptoms were occurring on an intermittent basis but now over the past few weeks his symptoms have been persistent.  He denies any associated arm weakness, chest pain, shortness of breath or dyspnea on exertion.  He states it feels almost as if he has a stinger in his left arm.  Of note states when he was younger he had imaging obtained of his neck and  was told that the vertebrae were fused as a result of multiple collisions from playing sports.      All other systems negative except for pertinent findings noted in HPI.       Current Outpatient Medications on File Prior to Visit   Medication Sig   • ALPRAZolam (XANAX) 0.25 mg tablet Take 1 tablet (0.25 mg total) by mouth daily at bedtime as needed for anxiety   • Ascorbic Acid, Vitamin C, (VITAMIN C) 100 MG tablet Take 100 mg by mouth   • Cholecalciferol (VITAMIN D3 PO) Take by mouth   • FLUoxetine (PROzac) 20 mg capsule Take 1 capsule (20 mg total) by mouth daily   • Na Sulfate-K Sulfate-Mg Sulf 17.5-3.13-1.6 GM/177ML SOLN Take 1 kit by mouth see administration instructions Follow instructions given at office   • Omega-3 Fatty Acids (FISH OIL PO) Take by mouth   • Red Yeast Rice Extract (RED YEAST RICE PO) Take by mouth   • sildenafil (REVATIO) 20 mg tablet    • traZODone (DESYREL) 50 mg tablet Take 1 tablet (50 mg total) by mouth daily at bedtime as needed for sleep   • Multiple Vitamins-Minerals (ZINC PO) Take by mouth       Objective     /86 (BP Location: Left arm, Patient Position: Sitting, Cuff Size: Large)   Pulse 72   Wt 106 kg (233 lb 3.2 oz)   SpO2 96%   BMI 34.44 kg/m²     BP Readings from Last 3 Encounters:   02/08/24 152/86   01/31/24 130/82   09/13/23 138/88        Wt Readings from Last 3 Encounters:   02/08/24 106 kg (233 lb 3.2 oz)   01/31/24 106 kg (233 lb)   09/13/23 107 kg (235 lb 9.6 oz)       Physical Exam    General: NAD  HEENT: NCAT  Cardiovascular: RRR, normal S1 and S2, no m/r/g  Pulmonary: Normal respiratory effort, no wheezes, rales or rhonchi  MSK: Normal bulk and tone, 5 out of 5 strength in upper extremities, no pain with palpation over cervical spine  Neuro: Non-focal, ambulating without difficulty, non-antalgic gait, 1+ biceps and brachioradialis DTR  Extremities: No lower extremity edema  Skin: Normal skin color, no rashes     Dylan Da Silva MD

## 2024-02-08 NOTE — ASSESSMENT & PLAN NOTE
-I suspect the patient's left arm heaviness and pain is related to nerve impingement in his neck.  His symptoms have gotten progressively worse and are now persistent over the past 2 weeks.  This is occurring in the setting of what appears to be significant degenerative cervical spine disease. 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

## 2024-02-16 DIAGNOSIS — F41.0 PANIC ATTACKS: ICD-10-CM

## 2024-02-16 DIAGNOSIS — F41.9 ANXIETY: ICD-10-CM

## 2024-02-16 RX ORDER — ALPRAZOLAM 0.25 MG/1
0.25 TABLET ORAL
Qty: 14 TABLET | Refills: 0 | Status: SHIPPED | OUTPATIENT
Start: 2024-02-16

## 2024-02-16 NOTE — TELEPHONE ENCOUNTER
Reason for call:   [x] Refill   [] Prior Auth  [] Other:     Office:   [x] PCP/Provider - Dylan Da Silva MD    [] Specialty/Provider -     Medication: ALPRAZolam (XANAX) 0.25 mg tablet     Dose/Frequency: 0.25 mg, Oral, Daily at bedtime PRN     Quantity: 14    Pharmacy: [id number] [address]     Does the patient have enough for 3 days?   [] Yes   [x] No - Send as HP to POD

## 2024-02-27 ENCOUNTER — TELEPHONE (OUTPATIENT)
Dept: INTERNAL MEDICINE CLINIC | Facility: CLINIC | Age: 64
End: 2024-02-27

## 2024-02-27 NOTE — TELEPHONE ENCOUNTER
Please notify patient he currently does not meet criteria for an MRI. I recommend physical therapy. If he would like a referral an order will be placed.

## 2024-02-27 NOTE — TELEPHONE ENCOUNTER
----- Message from Raul Noble MD sent at 2/27/2024  9:28 AM EST -----  Regarding: Information needed for MRI peer to peer review  Good Morning,    I'm sending a friendly reminder regarding the information we need for the peer to peer review for the MRI of cervical spine which has been denied, but we will attempt to get hopefully overturned.  Please see the Tiger text from yesterday for the information needed.  It would be best if the information can be addended to the visit note when the MRI was ordered, so it can be sent to insurance if needed.  If the needed information cannot be obtained or patient has not met criteria for MRI, he will need be referred for 6 weeks of PT to treat and assess improvement or worsening of symptoms prior to MRI being approved.  Please reach out to me via tiger text if you have any questions.  Again, very much appreciate your help.    Dk Noble

## 2024-02-28 DIAGNOSIS — G47.00 INSOMNIA, UNSPECIFIED TYPE: ICD-10-CM

## 2024-02-28 RX ORDER — TRAZODONE HYDROCHLORIDE 50 MG/1
50 TABLET ORAL
Qty: 90 TABLET | Refills: 1 | Status: SHIPPED | OUTPATIENT
Start: 2024-02-28

## 2024-03-04 ENCOUNTER — HOSPITAL ENCOUNTER (EMERGENCY)
Facility: HOSPITAL | Age: 64
Discharge: HOME | End: 2024-03-04
Attending: EMERGENCY MEDICINE
Payer: COMMERCIAL

## 2024-03-04 ENCOUNTER — APPOINTMENT (EMERGENCY)
Dept: CT IMAGING | Facility: HOSPITAL | Age: 64
End: 2024-03-04
Payer: COMMERCIAL

## 2024-03-04 ENCOUNTER — TELEPHONE (OUTPATIENT)
Dept: INTERNAL MEDICINE CLINIC | Facility: CLINIC | Age: 64
End: 2024-03-04

## 2024-03-04 VITALS
OXYGEN SATURATION: 98 % | RESPIRATION RATE: 18 BRPM | DIASTOLIC BLOOD PRESSURE: 81 MMHG | TEMPERATURE: 98 F | SYSTOLIC BLOOD PRESSURE: 147 MMHG | HEART RATE: 58 BPM

## 2024-03-04 DIAGNOSIS — N20.0 NEPHROLITHIASIS: Primary | ICD-10-CM

## 2024-03-04 LAB
ALBUMIN SERPL BCP-MCNC: 4.5 G/DL (ref 3.5–5)
ALP SERPL-CCNC: 74 U/L (ref 34–104)
ALT SERPL W P-5'-P-CCNC: 23 U/L (ref 7–52)
ANION GAP SERPL CALCULATED.3IONS-SCNC: 7 MMOL/L
AST SERPL W P-5'-P-CCNC: 30 U/L (ref 13–39)
BASOPHILS # BLD AUTO: 0.03 THOUSANDS/ÂΜL (ref 0–0.1)
BASOPHILS NFR BLD AUTO: 1 % (ref 0–1)
BILIRUB SERPL-MCNC: 0.65 MG/DL (ref 0.2–1)
BILIRUB UR QL STRIP: NEGATIVE
BUN SERPL-MCNC: 15 MG/DL (ref 5–25)
CALCIUM SERPL-MCNC: 9.2 MG/DL (ref 8.4–10.2)
CHLORIDE SERPL-SCNC: 109 MMOL/L (ref 96–108)
CLARITY UR: CLEAR
CO2 SERPL-SCNC: 24 MMOL/L (ref 21–32)
COLOR UR: YELLOW
CREAT SERPL-MCNC: 0.89 MG/DL (ref 0.6–1.3)
EOSINOPHIL # BLD AUTO: 0.06 THOUSAND/ÂΜL (ref 0–0.61)
EOSINOPHIL NFR BLD AUTO: 1 % (ref 0–6)
ERYTHROCYTE [DISTWIDTH] IN BLOOD BY AUTOMATED COUNT: 12.6 % (ref 11.6–15.1)
GFR SERPL CREATININE-BSD FRML MDRD: 92 ML/MIN/1.73SQ M
GLUCOSE SERPL-MCNC: 96 MG/DL (ref 65–140)
GLUCOSE UR STRIP-MCNC: NEGATIVE MG/DL
HCT VFR BLD AUTO: 45.8 % (ref 36.5–49.3)
HGB BLD-MCNC: 15.5 G/DL (ref 12–17)
HGB UR QL STRIP.AUTO: NEGATIVE
HOLD SPECIMEN: NORMAL
IMM GRANULOCYTES # BLD AUTO: 0.01 THOUSAND/UL (ref 0–0.2)
IMM GRANULOCYTES NFR BLD AUTO: 0 % (ref 0–2)
KETONES UR STRIP-MCNC: NEGATIVE MG/DL
LEUKOCYTE ESTERASE UR QL STRIP: NEGATIVE
LIPASE SERPL-CCNC: 25 U/L (ref 11–82)
LYMPHOCYTES # BLD AUTO: 1.63 THOUSANDS/ÂΜL (ref 0.6–4.47)
LYMPHOCYTES NFR BLD AUTO: 25 % (ref 14–44)
MCH RBC QN AUTO: 29.3 PG (ref 26.8–34.3)
MCHC RBC AUTO-ENTMCNC: 33.8 G/DL (ref 31.4–37.4)
MCV RBC AUTO: 87 FL (ref 82–98)
MONOCYTES # BLD AUTO: 0.5 THOUSAND/ÂΜL (ref 0.17–1.22)
MONOCYTES NFR BLD AUTO: 8 % (ref 4–12)
NEUTROPHILS # BLD AUTO: 4.35 THOUSANDS/ÂΜL (ref 1.85–7.62)
NEUTS SEG NFR BLD AUTO: 65 % (ref 43–75)
NITRITE UR QL STRIP: NEGATIVE
NRBC BLD AUTO-RTO: 0 /100 WBCS
PH UR STRIP.AUTO: 6.5 [PH]
PLATELET # BLD AUTO: 258 THOUSANDS/UL (ref 149–390)
PMV BLD AUTO: 9.4 FL (ref 8.9–12.7)
POTASSIUM SERPL-SCNC: 4.5 MMOL/L (ref 3.5–5.3)
PROT SERPL-MCNC: 7.1 G/DL (ref 6.4–8.4)
PROT UR STRIP-MCNC: NEGATIVE MG/DL
RBC # BLD AUTO: 5.29 MILLION/UL (ref 3.88–5.62)
SODIUM SERPL-SCNC: 140 MMOL/L (ref 135–147)
SP GR UR STRIP.AUTO: 1.02 (ref 1–1.03)
UROBILINOGEN UR STRIP-ACNC: <2 MG/DL
WBC # BLD AUTO: 6.58 THOUSAND/UL (ref 4.31–10.16)

## 2024-03-04 PROCEDURE — 85025 COMPLETE CBC W/AUTO DIFF WBC: CPT | Performed by: EMERGENCY MEDICINE

## 2024-03-04 PROCEDURE — 74176 CT ABD & PELVIS W/O CONTRAST: CPT

## 2024-03-04 PROCEDURE — 83690 ASSAY OF LIPASE: CPT | Performed by: EMERGENCY MEDICINE

## 2024-03-04 PROCEDURE — 99284 EMERGENCY DEPT VISIT MOD MDM: CPT

## 2024-03-04 PROCEDURE — 99284 EMERGENCY DEPT VISIT MOD MDM: CPT | Performed by: EMERGENCY MEDICINE

## 2024-03-04 PROCEDURE — 36415 COLL VENOUS BLD VENIPUNCTURE: CPT

## 2024-03-04 PROCEDURE — 80053 COMPREHEN METABOLIC PANEL: CPT | Performed by: EMERGENCY MEDICINE

## 2024-03-04 PROCEDURE — 96374 THER/PROPH/DIAG INJ IV PUSH: CPT

## 2024-03-04 PROCEDURE — 81003 URINALYSIS AUTO W/O SCOPE: CPT

## 2024-03-04 RX ORDER — KETOROLAC TROMETHAMINE 10 MG/1
10 TABLET, FILM COATED ORAL EVERY 6 HOURS PRN
Qty: 10 TABLET | Refills: 0 | Status: SHIPPED | OUTPATIENT
Start: 2024-03-04

## 2024-03-04 RX ORDER — KETOROLAC TROMETHAMINE 30 MG/ML
15 INJECTION, SOLUTION INTRAMUSCULAR; INTRAVENOUS ONCE
Status: COMPLETED | OUTPATIENT
Start: 2024-03-04 | End: 2024-03-04

## 2024-03-04 RX ORDER — TAMSULOSIN HYDROCHLORIDE 0.4 MG/1
0.4 CAPSULE ORAL
Qty: 21 CAPSULE | Refills: 0 | Status: SHIPPED | OUTPATIENT
Start: 2024-03-04 | End: 2024-03-25

## 2024-03-04 RX ADMIN — KETOROLAC TROMETHAMINE 15 MG: 30 INJECTION, SOLUTION INTRAMUSCULAR; INTRAVENOUS at 20:39

## 2024-03-04 NOTE — TELEPHONE ENCOUNTER
Lm for pt and advised that the MRI was denied so he will need to cancel his scheduled MRI for later this month. Advised he call back to confirm whether or not he would still like to hold off on PT for now.

## 2024-03-05 NOTE — ED PROVIDER NOTES
History  Chief Complaint   Patient presents with    Flank Pain     Reports right flank pain since yesterday, Hx of kidney stones, feels same.      Patient is a 63-year-old male who presented to the ED for right flank pain.  Patient states that he has extensive history of kidney stones and believes that his current pain feels just like previous kidney stones that he has had.  States that the pain started yesterday and has been waxing and waning, currently 3/10 but has gone up to 8/10 in the past.  Patient denies any urinary symptoms. He also denies any bowel changes.  He states that the pain is nonradiating, no groin or pelvic pain.  Patient has not had any nausea or vomiting.  Patient denies any fevers, chills, or other systemic symptoms.  On evaluation, patient was well-appearing and in no acute distress.        Prior to Admission Medications   Prescriptions Last Dose Informant Patient Reported? Taking?   ALPRAZolam (XANAX) 0.25 mg tablet   No No   Sig: Take 1 tablet (0.25 mg total) by mouth daily at bedtime as needed for anxiety   Ascorbic Acid, Vitamin C, (VITAMIN C) 100 MG tablet   Yes No   Sig: Take 100 mg by mouth   Cholecalciferol (VITAMIN D3 PO)   Yes No   Sig: Take by mouth   FLUoxetine (PROzac) 20 mg capsule   No No   Sig: Take 1 capsule (20 mg total) by mouth daily   Multiple Vitamins-Minerals (ZINC PO)   Yes No   Sig: Take by mouth   Na Sulfate-K Sulfate-Mg Sulf 17.5-3.13-1.6 GM/177ML SOLN   No No   Sig: Take 1 kit by mouth see administration instructions Follow instructions given at office   Omega-3 Fatty Acids (FISH OIL PO)   Yes No   Sig: Take by mouth   Red Yeast Rice Extract (RED YEAST RICE PO)   Yes No   Sig: Take by mouth   sildenafil (REVATIO) 20 mg tablet   Yes No   traZODone (DESYREL) 50 mg tablet   No No   Sig: Take 1 tablet (50 mg total) by mouth daily at bedtime as needed for sleep      Facility-Administered Medications: None       Past Medical History:   Diagnosis Date    Depression      GERD (gastroesophageal reflux disease)     History of kidney stones     kidney stones    LISA on CPAP        Past Surgical History:   Procedure Laterality Date    APPENDECTOMY      COLONOSCOPY  02/19/2013    slight internal non-bleed hemorroids in anal canal. no specimens. Dr. Yuan    HERNIA REPAIR      MENISCECTOMY Left 2018    PROSTATECTOMY         Family History   Problem Relation Age of Onset    Skin cancer Mother     Macular degeneration Mother     Diabetes Father     Heart attack Maternal Grandmother     Hypertension Maternal Grandmother     Lung disease Neg Hx      I have reviewed and agree with the history as documented.    E-Cigarette/Vaping    E-Cigarette Use Never User      E-Cigarette/Vaping Substances    Nicotine No     THC No     CBD No      Social History     Tobacco Use    Smoking status: Some Days     Types: Cigars    Smokeless tobacco: Never    Tobacco comments:     Smokes 3 to 5 cigars a week    Vaping Use    Vaping status: Never Used   Substance Use Topics    Alcohol use: Not Currently     Alcohol/week: 1.0 - 2.0 standard drink of alcohol     Types: 1 - 2 Standard drinks or equivalent per week    Drug use: No        Review of Systems   Constitutional:  Negative for chills, fatigue and fever.   HENT: Negative.     Respiratory:  Negative for cough and shortness of breath.    Cardiovascular:  Negative for chest pain and palpitations.   Gastrointestinal:  Negative for abdominal pain, nausea and vomiting.   Genitourinary:  Positive for flank pain. Negative for dysuria, frequency and urgency.   Musculoskeletal:  Negative for arthralgias and myalgias.   Skin:  Negative for color change and pallor.   Neurological:  Negative for syncope, light-headedness and headaches.   Psychiatric/Behavioral:  Negative for agitation, behavioral problems and confusion.    All other systems reviewed and are negative.      Physical Exam  ED Triage Vitals   Temperature Pulse Respirations Blood Pressure SpO2   03/04/24 1736  03/04/24 1738 03/04/24 1738 03/04/24 1738 03/04/24 1738   98 °F (36.7 °C) 58 18 147/81 98 %      Temp Source Heart Rate Source Patient Position - Orthostatic VS BP Location FiO2 (%)   03/04/24 1736 03/04/24 1738 -- -- --   Oral Monitor         Pain Score       03/04/24 1738       4             Orthostatic Vital Signs  Vitals:    03/04/24 1738   BP: 147/81   Pulse: 58       Physical Exam  Vitals and nursing note reviewed.   Constitutional:       Appearance: Normal appearance.   HENT:      Head: Normocephalic and atraumatic.      Nose: Nose normal.      Mouth/Throat:      Mouth: Mucous membranes are moist.      Pharynx: Oropharynx is clear.   Eyes:      Extraocular Movements: Extraocular movements intact.      Conjunctiva/sclera: Conjunctivae normal.      Pupils: Pupils are equal, round, and reactive to light.   Cardiovascular:      Rate and Rhythm: Normal rate and regular rhythm.      Pulses: Normal pulses.      Heart sounds: Normal heart sounds.   Abdominal:      General: Abdomen is flat. Bowel sounds are normal.      Palpations: Abdomen is soft.      Tenderness: There is right CVA tenderness.   Skin:     General: Skin is warm and dry.      Capillary Refill: Capillary refill takes less than 2 seconds.   Neurological:      General: No focal deficit present.      Mental Status: He is alert and oriented to person, place, and time.   Psychiatric:         Mood and Affect: Mood normal.         Behavior: Behavior normal.         Thought Content: Thought content normal.         ED Medications  Medications   ketorolac (TORADOL) injection 15 mg (15 mg Intravenous Given 3/4/24 2039)       Diagnostic Studies  Results Reviewed       Procedure Component Value Units Date/Time    UA w Reflex to Microscopic w Reflex to Culture [[id number]] Collected: 03/04/24 1852    Lab Status: Final result Specimen: Urine, Clean Catch Updated: 03/04/24 1910     Color, UA Yellow     Clarity, UA Clear     Specific Gravity, UA 1.020     pH, UA 6.5      Leukocytes, UA Negative     Nitrite, UA Negative     Protein, UA Negative mg/dl      Glucose, UA Negative mg/dl      Ketones, UA Negative mg/dl      Urobilinogen, UA <2.0 mg/dl      Bilirubin, UA Negative     Occult Blood, UA Negative    [place] draw [[id number]] Collected: 03/04/24 1740    Lab Status: Final result Specimen: Blood from Arm, Right Updated: 03/04/24 1901    Narrative:      The following orders were created for panel order [place] draw.  Procedure                               Abnormality         Status                     ---------                               -----------         ------                     Light Blue Top on hold[[id number]]                           Final result               Green / Black tube on hold[[id number]]                       Final result                 Please view results for these tests on the individual orders.    Comprehensive metabolic panel [[id number]]  (Abnormal) Collected: 03/04/24 1740    Lab Status: Final result Specimen: Blood from Arm, Right Updated: 03/04/24 1809     Sodium 140 mmol/L      Potassium 4.5 mmol/L      Chloride 109 mmol/L      CO2 24 mmol/L      ANION GAP 7 mmol/L      BUN 15 mg/dL      Creatinine 0.89 mg/dL      Glucose 96 mg/dL      Calcium 9.2 mg/dL      AST 30 U/L      ALT 23 U/L      Alkaline Phosphatase 74 U/L      Total Protein 7.1 g/dL      Albumin 4.5 g/dL      Total Bilirubin 0.65 mg/dL      eGFR 92 ml/min/1.73sq m     Narrative:      National Kidney Disease Foundation guidelines for Chronic Kidney Disease (CKD):     Stage 1 with normal or high GFR (GFR > 90 mL/min/1.73 square meters)    Stage 2 Mild CKD (GFR = 60-89 mL/min/1.73 square meters)    Stage 3A Moderate CKD (GFR = 45-59 mL/min/1.73 square meters)    Stage 3B Moderate CKD (GFR = 30-44 mL/min/1.73 square meters)    Stage 4 Severe CKD (GFR = 15-29 mL/min/1.73 square meters)    Stage 5 End Stage CKD (GFR <15 mL/min/1.73 square meters)  Note: GFR calculation is accurate only with a  steady state creatinine    Lipase [[id number]]  (Normal) Collected: 03/04/24 1740    Lab Status: Final result Specimen: Blood from Arm, Right Updated: 03/04/24 1809     Lipase 25 u/L     CBC and differential [[id number]] Collected: 03/04/24 1740    Lab Status: Final result Specimen: Blood from Arm, Right Updated: 03/04/24 1750     WBC 6.58 Thousand/uL      RBC 5.29 Million/uL      Hemoglobin 15.5 g/dL      Hematocrit 45.8 %      MCV 87 fL      MCH 29.3 pg      MCHC 33.8 g/dL      RDW 12.6 %      MPV 9.4 fL      Platelets 258 Thousands/uL      nRBC 0 /100 WBCs      Neutrophils Relative 65 %      Immat GRANS % 0 %      Lymphocytes Relative 25 %      Monocytes Relative 8 %      Eosinophils Relative 1 %      Basophils Relative 1 %      Neutrophils Absolute 4.35 Thousands/µL      Immature Grans Absolute 0.01 Thousand/uL      Lymphocytes Absolute 1.63 Thousands/µL      Monocytes Absolute 0.50 Thousand/µL      Eosinophils Absolute 0.06 Thousand/µL      Basophils Absolute 0.03 Thousands/µL                    CT renal stone study abdomen pelvis wo contrast   Final Result by Jen Madrid MD (03/04 2024)         1. Right hydronephrosis and hydroureter with a 3 x 6 mm obstructing calculus in the proximal right ureter.   2. Nonobstructing right renal calculi.   3. Slightly hyperdense lesion arising from the lower pole of the left kidney. There has been previous outside evaluation with renal ultrasound or contrast-enhanced CT of the abdomen and pelvis.   The study was marked in EPIC for immediate notification.         Workstation performed: [id number]               Procedures  Procedures      ED Course  ED Course as of 03/05/24 0108   Mon Mar 04, 2024   2024 . Right hydronephrosis and hydroureter with a 3 x 6 mm obstructing calculus in the proximal right ureter.  2. Nonobstructing right renal calculi.  3. Slightly hyperdense lesion arising from the lower pole of the left kidney. There has been previous outside evaluation  with renal ultrasound or contrast-enhanced CT of the abdomen and pelvis.  The study was marked in EPIC for immediate notification.     2039 Toradol given for pain                                       Medical Decision Making  Patient is a 63-year-old male who presented to the ED for right flank pain.  Patient states that he has extensive history of kidney stones and believes that his current pain feels just like previous kidney stones that he has had.  States that the pain started yesterday and has been waxing and waning, currently 3/10 but has gone up to 8/10 in the past.  Patient denies any urinary symptoms. He also denies any bowel changes.  He states that the pain is nonradiating, no groin or pelvic pain.  Patient has not had any nausea or vomiting.  Patient denies any fevers, chills, or other systemic symptoms.  On evaluation, patient was well-appearing and in no acute distress.    Ddx includes but is not limited to nephrolithiasis, pyelonephritis, MSK pain.  UA was unremarkable.  CT stone study was performed which showed 6 mm obstructing stone with associated hydronephrosis.  Patient was educated about this and conservative management for expectant stone passage.  He was given Toradol for pain.  He was discharged with tamsulosin to promote expulsion.  He was also given urology follow-up.  Patient was discharged with explicit return precautions.    Amount and/or Complexity of Data Reviewed  Labs: ordered.  Radiology: ordered.    Risk  Prescription drug management.          Disposition  Final diagnoses:   Nephrolithiasis     Time reflects when diagnosis was documented in both MDM as applicable and the Disposition within this note       Time User Action Codes Description Comment    3/4/2024  8:38 PM Raul De La Cruz [N20.0] Nephrolithiasis           ED Disposition       ED Disposition   Discharge    Condition   Stable    Date/Time   Mon Mar 4, 2024  8:40 PM    Comment   Raul Phelan discharge to home/self  care.                   Follow-up Information       Follow up With Specialties Details Why Contact Info Additional Information    Dylan Da Silva MD Internal Medicine   [address] [place] [address] [address] [phone]       [facility] [place] [facility]   [address]  [address]  [facility] [address] [phone] [facility] [place], [address], [place], [place], [address] [phone]            Discharge Medication List as of 3/4/2024  8:40 PM        START taking these medications    Details   ketorolac (TORADOL) 10 mg tablet Take 1 tablet (10 mg total) by mouth every 6 (six) hours as needed for moderate pain for up to 10 doses, Starting Mon 3/4/2024, Normal      tamsulosin (FLOMAX) 0.4 mg Take 1 capsule (0.4 mg total) by mouth daily with dinner for 21 days, Starting Mon 3/4/2024, Until Mon 3/25/2024, Normal           CONTINUE these medications which have NOT CHANGED    Details   ALPRAZolam (XANAX) 0.25 mg tablet Take 1 tablet (0.25 mg total) by mouth daily at bedtime as needed for anxiety, Starting Fri 2/16/2024, Normal      Ascorbic Acid, Vitamin C, (VITAMIN C) 100 MG tablet Take 100 mg by mouth, Historical Med      Cholecalciferol (VITAMIN D3 PO) Take by mouth, Historical Med      FLUoxetine (PROzac) 20 mg capsule Take 1 capsule (20 mg total) by mouth daily, Starting Wed 1/31/2024, Normal      Multiple Vitamins-Minerals (ZINC PO) Take by mouth, Historical Med      Na Sulfate-K Sulfate-Mg Sulf 17.5-3.13-1.6 GM/177ML SOLN Take 1 kit by mouth see administration instructions Follow instructions given at office, Starting Fri 11/3/2023, Normal      Omega-3 Fatty Acids (FISH OIL PO) Take by mouth, Historical Med      Red Yeast Rice Extract (RED YEAST RICE PO) Take by mouth, Historical Med      sildenafil (REVATIO) 20 mg tablet Historical Med      traZODone (DESYREL) 50 mg tablet Take 1 tablet (50 mg total) by mouth daily at bedtime as  needed for sleep, Starting Wed 2/28/2024, Normal               PDMP Review         Value Time User    PDMP Reviewed  Yes 2/16/2024 11:44 AM Dylan Da Silva MD             ED Provider  Attending physically available and evaluated Raul Phelan. I managed the patient along with the ED Attending.    Electronically Signed by           Raul De La Cruz MD  03/05/24 0108

## 2024-03-05 NOTE — DISCHARGE INSTRUCTIONS
Please come back to the ER if you have any new or worsening symptoms including worsening flank pain, intractable nausea vomiting, inability to tolerate oral intake, fevers, chills, or just not feeling well.    Please follow-up with the urologist as soon as possible.

## 2024-03-11 ENCOUNTER — TELEPHONE (OUTPATIENT)
Age: 64
End: 2024-03-11

## 2024-03-12 DIAGNOSIS — R73.01 IFG (IMPAIRED FASTING GLUCOSE): Primary | ICD-10-CM

## 2024-03-13 LAB
ALBUMIN SERPL-MCNC: 4.3 G/DL (ref 3.6–5.1)
ALBUMIN/GLOB SERPL: 2.2 (CALC) (ref 1–2.5)
ALP SERPL-CCNC: 77 U/L (ref 35–144)
ALT SERPL-CCNC: 22 U/L (ref 9–46)
AST SERPL-CCNC: 24 U/L (ref 10–35)
BILIRUB SERPL-MCNC: 0.5 MG/DL (ref 0.2–1.2)
BUN SERPL-MCNC: 15 MG/DL (ref 7–25)
BUN/CREAT SERPL: ABNORMAL (CALC) (ref 6–22)
CALCIUM SERPL-MCNC: 9 MG/DL (ref 8.6–10.3)
CHLORIDE SERPL-SCNC: 108 MMOL/L (ref 98–110)
CHOLEST SERPL-MCNC: 153 MG/DL
CHOLEST/HDLC SERPL: 3.3 (CALC)
CO2 SERPL-SCNC: 28 MMOL/L (ref 20–32)
CREAT SERPL-MCNC: 0.88 MG/DL (ref 0.7–1.35)
GFR/BSA.PRED SERPLBLD CYS-BASED-ARV: 98 ML/MIN/1.73M2
GLOBULIN SER CALC-MCNC: 2 G/DL (CALC) (ref 1.9–3.7)
GLUCOSE SERPL-MCNC: 109 MG/DL (ref 65–99)
HDLC SERPL-MCNC: 46 MG/DL
LDLC SERPL CALC-MCNC: 94 MG/DL (CALC)
NONHDLC SERPL-MCNC: 107 MG/DL (CALC)
POTASSIUM SERPL-SCNC: 4.6 MMOL/L (ref 3.5–5.3)
PROT SERPL-MCNC: 6.3 G/DL (ref 6.1–8.1)
PSA SERPL DL<=0.01 NG/ML-MCNC: <0.02 NG/ML
SODIUM SERPL-SCNC: 141 MMOL/L (ref 135–146)
TRIGL SERPL-MCNC: 50 MG/DL

## 2024-03-18 ENCOUNTER — TELEPHONE (OUTPATIENT)
Dept: ADMINISTRATIVE | Facility: OTHER | Age: 64
End: 2024-03-18

## 2024-03-19 NOTE — ED ATTENDING ATTESTATION
3/4/2024  IMusa DO, saw and evaluated the patient. I have discussed the patient with the resident/non-physician practitioner and agree with the resident's/non-physician practitioner's findings, Plan of Care, and MDM as documented in the resident's/non-physician practitioner's note, except where noted. All available labs and Radiology studies were reviewed.  I was present for key portions of any procedure(s) performed by the resident/non-physician practitioner and I was immediately available to provide assistance.       At this point I agree with the current assessment done in the Emergency Department.  I have conducted an independent evaluation of this patient a history and physical is as follows:    ED Course         Critical Care Time  Procedures

## 2024-04-09 ENCOUNTER — TELEPHONE (OUTPATIENT)
Dept: CARDIOLOGY | Facility: CLINIC | Age: 64
End: 2024-04-09

## 2024-04-11 ENCOUNTER — TELEPHONE (OUTPATIENT)
Dept: CARDIOLOGY | Facility: CLINIC | Age: 64
End: 2024-04-11

## 2024-04-22 ENCOUNTER — OFFICE VISIT (OUTPATIENT)
Dept: CARDIOLOGY | Facility: CLINIC | Age: 64
End: 2024-04-22
Payer: MEDICARE

## 2024-04-22 VITALS
BODY MASS INDEX: 19.64 KG/M2 | DIASTOLIC BLOOD PRESSURE: 68 MMHG | SYSTOLIC BLOOD PRESSURE: 116 MMHG | WEIGHT: 137.2 LBS | OXYGEN SATURATION: 93 % | HEIGHT: 70 IN | HEART RATE: 76 BPM

## 2024-04-22 DIAGNOSIS — I25.118 CORONARY ARTERY DISEASE OF NATIVE ARTERY OF NATIVE HEART WITH STABLE ANGINA PECTORIS: Primary | ICD-10-CM

## 2024-04-22 DIAGNOSIS — E78.5 HYPERLIPIDEMIA LDL GOAL <70: ICD-10-CM

## 2024-04-22 DIAGNOSIS — R07.9 CHRONIC CHEST PAIN WITH LOW TO MODERATE RISK FOR CAD: ICD-10-CM

## 2024-04-22 DIAGNOSIS — G89.29 CHRONIC CHEST PAIN WITH LOW TO MODERATE RISK FOR CAD: ICD-10-CM

## 2024-04-22 DIAGNOSIS — Z95.810 S/P ICD (INTERNAL CARDIAC DEFIBRILLATOR) PROCEDURE: ICD-10-CM

## 2024-04-22 DIAGNOSIS — I50.22 CHRONIC HFREF (HEART FAILURE WITH REDUCED EJECTION FRACTION): ICD-10-CM

## 2024-04-22 DIAGNOSIS — Z91.89 CHRONIC CHEST PAIN WITH LOW TO MODERATE RISK FOR CAD: ICD-10-CM

## 2024-04-22 DIAGNOSIS — E03.9 HYPOTHYROIDISM (ACQUIRED): ICD-10-CM

## 2024-04-22 PROCEDURE — 93000 ELECTROCARDIOGRAM COMPLETE: CPT | Performed by: INTERNAL MEDICINE

## 2024-04-22 PROCEDURE — 99204 OFFICE O/P NEW MOD 45 MIN: CPT | Performed by: INTERNAL MEDICINE

## 2024-04-22 RX ORDER — GABAPENTIN 800 MG/1
TABLET ORAL
COMMUNITY
Start: 2024-04-19

## 2024-04-22 RX ORDER — INSULIN GLARGINE 100 [IU]/ML
INJECTION, SOLUTION SUBCUTANEOUS
COMMUNITY
Start: 2024-03-13

## 2024-04-22 RX ORDER — ATORVASTATIN CALCIUM 10 MG/1
10 TABLET, FILM COATED ORAL DAILY
COMMUNITY
Start: 2024-03-12

## 2024-04-22 RX ORDER — CARVEDILOL 3.12 MG/1
TABLET ORAL
COMMUNITY
Start: 2024-01-22

## 2024-04-22 RX ORDER — TICAGRELOR 90 MG/1
1 TABLET ORAL EVERY 12 HOURS SCHEDULED
COMMUNITY
Start: 2024-03-13

## 2024-04-22 RX ORDER — TAMSULOSIN HYDROCHLORIDE 0.4 MG/1
1 CAPSULE ORAL DAILY
COMMUNITY
Start: 2024-01-09

## 2024-04-22 NOTE — LETTER
April 22, 2024     STEFANY Davidson  [address]  [place] [address]    Patient: Reynaldo Payne   YOB: 1960   Date of Visit: 4/22/2024       Dear STEFANY Davidson,    Reynaldo Payne was in my office today. Below are the relevant portions of my assessment and plan of care.           If you have questions, please do not hesitate to call me. I look forward to following Reynaldo along with you.         Sincerely,        Adarsh Knight MD        CC: Jovanny Killian MD

## 2024-04-22 NOTE — LETTER
April 22, 2024     STEFANY Davidson  [address]  [place] [address]    Patient: Reynaldo Payne   YOB: 1960   Date of Visit: 4/22/2024       Dear STEFANY Davidson    Reynaldo Payne was in my office today. Below is a copy of my note.    If you have questions, please do not hesitate to call me. I look forward to following Reynaldo along with you.         Sincerely,        Adarsh Knight MD        CC: Jovanny Killian MD    No notes on file

## 2024-04-22 NOTE — PROGRESS NOTES
Subjective   Chief Complaint   Patient presents with    Surgical Clearance     Cataract surgery         History of Present Illness    Reynaldo is 63 years old white male who is planning to have cataract surgery however anesthesiologist did not want to proceed with surgery and wanted cardiology clearance.    Patient does not know much details about his cardiac history     Hospital records from [place] indicated in November 2018 EMS arrived at his home because of chest pain.  Patient had been vomiting for 2 days.  He was found to be apneic and pulseless  He has history of diabetes mellitus, hyperlipidemia and alcohol intake     He was resuscitated and transported to [facility] in [place].  Patient had anterior wall myocardial infarction and transient atrial fibrillation.  Hehad cardiopulmonary arrest and was transferred to [facility].  Records are not available.  Patient states that he may have had stent at that time to.  Does not have any stent card with him.    Patient has ICD in place.  Patient does not remember the name of the doctors who implanted the pacemaker defibrillator however records indicate the patient was seen by Dr. Andi Mccoy and then had been interrogated by Dr. Schoen.  PCP indicates in her report that he has been seeing Dr. Santoro in [place]  Patient however does not recollect any of that.    reports are not available.  No cardiology report is available    He denies any chest pain palpitations or shortness of breath.    Past Surgical History:   Procedure Laterality Date    APPENDECTOMY      CATARACT EXTRACTION  04/2024     Family History   Family history unknown: Yes     Past Medical History:   Diagnosis Date    Coronary artery disease of native artery with stable angina pectoris 4/22/2024    Diabetes mellitus     Thyroid nodule        Patient Active Problem List   Diagnosis    Sebaceous cyst    Coronary artery disease of native artery with stable angina pectoris,s/p stent     Chronic HFrEF (heart failure with reduced ejection fraction),EF 20 %    Chronic chest pain with low to moderate risk for CAD         Social History     Tobacco Use    Smoking status: Former     Passive exposure: Never    Smokeless tobacco: Current     Types: Snuff   Vaping Use    Vaping status: Never Used   Substance Use Topics    Alcohol use: Not Currently     Comment: Last use - today    Drug use: No         The following portions of the patient's history were reviewed and updated as appropriate: allergies, current medications, past family history, past medical history, past social history, past surgical history and problem list.    No Known Allergies      Current Outpatient Medications:     aspirin 81 MG EC tablet, Take 1 tablet by mouth Daily., Disp: , Rfl:     atorvastatin (LIPITOR) 10 MG tablet, Take 1 tablet by mouth Daily. for cholesterol, Disp: , Rfl:     Brilinta 90 MG tablet tablet, Take 1 tablet by mouth Every 12 (Twelve) Hours., Disp: , Rfl:     carvedilol (COREG) 3.125 MG tablet, TAKE ONE TABLET BY MOUTH EVERY DAY FOR HEART/ BLOOD PRESSURE, Disp: , Rfl:     gabapentin (NEURONTIN) 800 MG tablet, TAKE ONE TABLET BY MOUTH THREE TIMES A DAY AS NEEDED FOR NERVE PAIN, Disp: , Rfl:     Insulin Glargine (BASAGLAR KWIKPEN) 100 UNIT/ML injection pen, INJECT 25 UNIT(S) SUBCUTANEOUSLY EVERY NIGHT AT BEDTIME, Disp: , Rfl:     levothyroxine (SYNTHROID, LEVOTHROID) 100 MCG tablet, Take 1 tablet by mouth Daily., Disp: , Rfl:     tamsulosin (FLOMAX) 0.4 MG capsule 24 hr capsule, Take 1 capsule by mouth Daily., Disp: , Rfl:     insulin glargine (LANTUS) 100 UNIT/ML injection, Inject 25 Units under the skin into the appropriate area as directed Daily. (Patient not taking: Reported on 4/22/2024), Disp: , Rfl:     omeprazole (priLOSEC) 20 MG capsule, Take 20 mg by mouth Daily. (Patient not taking: Reported on 4/22/2024), Disp: , Rfl:     Review of Systems   Constitutional: Positive for malaise/fatigue.   HENT: Negative.   "Negative for congestion.    Eyes: Negative.    Cardiovascular: Negative.  Negative for chest pain, cyanosis, dyspnea on exertion, irregular heartbeat, leg swelling, near-syncope, orthopnea, palpitations, paroxysmal nocturnal dyspnea and syncope.   Respiratory: Negative.  Negative for shortness of breath.    Hematologic/Lymphatic: Negative.    Musculoskeletal: Negative.    Gastrointestinal: Negative.    Neurological: Negative.  Negative for headaches.        Objective      /68 (BP Location: Left arm, Patient Position: Sitting, Cuff Size: Adult)   Pulse 76   Ht 177.8 cm (70\")   Wt 62.2 kg (137 lb 3.2 oz)   SpO2 93%   BMI 19.69 kg/m²     Pulmonary:      Effort: Pulmonary effort is normal.      Breath sounds: Normal breath sounds. No stridor. No wheezing. No rhonchi. No rales.   Cardiovascular:      PMI at left midclavicular line. Normal rate. Regular rhythm. Normal S1. Normal S2.       Murmurs: There is no murmur.      No gallop.  No click. No rub.   Pulses:     Intact distal pulses.   Edema:     Peripheral edema absent.         Lab Review:              ECG 12 Lead    Date/Time: 4/22/2024 1:13 PM  Performed by: Adarsh Knight MD    Authorized by: Adarsh Knight MD  Comparison: not compared with previous ECG   Previous ECG: no previous ECG available  Rhythm: sinus rhythm  Rate: normal  Conduction: 1st degree AV block  QRS axis: indeterminate  Other findings: non-specific ST-T wave changes and left atrial abnormality    Clinical impression: myocardial infarction  Comments: Old anterior wall myocardial infarction          I reviewed the patient's new clinical results.  I personally viewed and interpreted the patient's EKG/lab data    Labs are quite old and some of them are not interpretable  Will repeat labs.        Assessment:   Diagnosis Plan   1. Coronary artery disease of native artery of native heart with stable angina pectoris  ECG 12 Lead      2. Chronic HFrEF (heart failure with " reduced ejection fraction),EF 20 %  proBNP    Magnesium    Stress Test With Myocardial Perfusion One Day    ECG 12 Lead    Ambulatory Referral to Cardiac Electrophysiology      3. Chronic chest pain with low to moderate risk for CAD  Adult Transthoracic Echo Complete W/ Cont if Necessary Per Protocol      4. Hyperlipidemia LDL goal <70  CBC & Differential    Comprehensive Metabolic Panel    Lipid Panel      5. Hypothyroidism (acquired)  T4, Free    TSH      6. S/P ICD (internal cardiac defibrillator) procedure  Ambulatory Referral to Cardiac Electrophysiology             Plan:  1. Coronary artery disease of native artery of native heart with stable angina pectoris    2. Chronic HFrEF (heart failure with reduced ejection fraction),EF 20 %    3. Chronic chest pain with low to moderate risk for CAD    4. Hyperlipidemia LDL goal <70    5. Hypothyroidism (acquired)    6. S/P ICD (internal cardiac defibrillator) procedure      Records from PCP          Lengthy discussion with the patient about importance of following with EP.  He apparently is not seeing anybody and and states that he has not seen Dr. Santoro records from PCP to indicate that he is followed by Dr. Santoro.  Patient has chronic HFrEF but he is not on any guideline based therapy.  Is not taking  Entresto, Aldactone, Farxiga or Lasix.    No proBNP is available.  Lab work ordered including proBNP  Will check echocardiogram for LV functions evaluation.  Will also get Lexiscan stress test.  Patient will be reevaluated after workup.    Will also arrange referral with Dr. Santoro because of ICD and heart failure follow-up.          Thank you for giving me the oppertunity to participate in your patient's cardiac care.    Sincerely,    DIMITRI Knight M.D. FACP [facility]     No follow-ups on file.

## 2024-05-02 ENCOUNTER — TELEPHONE (OUTPATIENT)
Dept: CARDIOLOGY | Facility: CLINIC | Age: 64
End: 2024-05-02
Payer: MEDICARE

## 2024-05-02 NOTE — TELEPHONE ENCOUNTER
Left message on answering machine requesting patient return my call.  Patient has an appointment with Dr. Guero Santoro on 8-7-24 at 9:45 a.m.  Dr. Santoro's phone number is [phone].

## 2024-05-03 ENCOUNTER — TELEPHONE (OUTPATIENT)
Dept: CARDIOLOGY | Facility: CLINIC | Age: 64
End: 2024-05-03
Payer: MEDICARE

## 2024-05-03 NOTE — TELEPHONE ENCOUNTER
Left detailed phone message for patient with appointment date of 8-7-24 at 9:45 a.m. with Dr. Santoro.  Also given office phone number of [phone].

## 2024-06-06 DIAGNOSIS — G47.00 INSOMNIA, UNSPECIFIED TYPE: ICD-10-CM

## 2024-06-07 RX ORDER — TRAZODONE HYDROCHLORIDE 50 MG/1
50 TABLET ORAL
Qty: 90 TABLET | Refills: 1 | Status: SHIPPED | OUTPATIENT
Start: 2024-06-07

## 2024-07-11 ENCOUNTER — TRANSCRIBE ORDERS (OUTPATIENT)
Dept: ADMINISTRATIVE | Facility: HOSPITAL | Age: 64
End: 2024-07-11
Payer: MEDICARE

## 2024-07-11 DIAGNOSIS — R91.1 LUNG NODULE: Primary | ICD-10-CM

## 2024-07-12 ENCOUNTER — TELEPHONE (OUTPATIENT)
Age: 64
End: 2024-07-12

## 2024-07-12 NOTE — TELEPHONE ENCOUNTER
Raul returning our call in regards to changing the time of his appointment on 8/1. Unable to locate the time the appointment should be switched to. Called and spoke to Denita, and she was able to take the call over and assist the patient.

## 2024-08-01 ENCOUNTER — OFFICE VISIT (OUTPATIENT)
Dept: INTERNAL MEDICINE CLINIC | Facility: CLINIC | Age: 64
End: 2024-08-01
Payer: COMMERCIAL

## 2024-08-01 ENCOUNTER — HOSPITAL ENCOUNTER (OUTPATIENT)
Dept: SLEEP CENTER | Facility: CLINIC | Age: 64
Discharge: HOME | End: 2024-08-01
Payer: COMMERCIAL

## 2024-08-01 VITALS
BODY MASS INDEX: 34.48 KG/M2 | HEART RATE: 64 BPM | HEIGHT: 69 IN | SYSTOLIC BLOOD PRESSURE: 112 MMHG | OXYGEN SATURATION: 99 % | DIASTOLIC BLOOD PRESSURE: 82 MMHG | WEIGHT: 232.8 LBS

## 2024-08-01 DIAGNOSIS — L30.9 DERMATITIS: ICD-10-CM

## 2024-08-01 DIAGNOSIS — J30.89 NON-SEASONAL ALLERGIC RHINITIS, UNSPECIFIED TRIGGER: ICD-10-CM

## 2024-08-01 DIAGNOSIS — G47.00 INSOMNIA, UNSPECIFIED TYPE: ICD-10-CM

## 2024-08-01 DIAGNOSIS — I10 ESSENTIAL HYPERTENSION: Primary | ICD-10-CM

## 2024-08-01 DIAGNOSIS — F41.0 PANIC ATTACKS: ICD-10-CM

## 2024-08-01 DIAGNOSIS — F41.9 ANXIETY: ICD-10-CM

## 2024-08-01 DIAGNOSIS — C61 ADENOCARCINOMA OF PROSTATE (HCC): ICD-10-CM

## 2024-08-01 DIAGNOSIS — N20.0 NEPHROLITHIASIS: ICD-10-CM

## 2024-08-01 DIAGNOSIS — G47.33 OSA ON CPAP: ICD-10-CM

## 2024-08-01 PROCEDURE — 3725F SCREEN DEPRESSION PERFORMED: CPT | Performed by: INTERNAL MEDICINE

## 2024-08-01 PROCEDURE — 95811 POLYSOM 6/>YRS CPAP 4/> PARM: CPT

## 2024-08-01 PROCEDURE — 99214 OFFICE O/P EST MOD 30 MIN: CPT | Performed by: INTERNAL MEDICINE

## 2024-08-01 RX ORDER — CLINDAMYCIN PHOSPHATE 10 MG/G
GEL TOPICAL 2 TIMES DAILY
Qty: 30 G | Refills: 0 | Status: SHIPPED | OUTPATIENT
Start: 2024-08-01

## 2024-08-01 RX ORDER — TRAZODONE HYDROCHLORIDE 50 MG/1
50 TABLET ORAL
Qty: 90 TABLET | Refills: 3 | Status: SHIPPED | OUTPATIENT
Start: 2024-08-01

## 2024-08-01 RX ORDER — ALPRAZOLAM 0.25 MG/1
0.25 TABLET ORAL
Qty: 14 TABLET | Refills: 0 | Status: SHIPPED | OUTPATIENT
Start: 2024-08-01

## 2024-08-01 RX ORDER — FLUOXETINE HYDROCHLORIDE 20 MG/1
20 CAPSULE ORAL DAILY
Qty: 90 CAPSULE | Refills: 3 | Status: SHIPPED | OUTPATIENT
Start: 2024-08-01

## 2024-08-01 RX ORDER — FLUTICASONE PROPIONATE 50 MCG
1 SPRAY, SUSPENSION (ML) NASAL DAILY
COMMUNITY
End: 2024-08-01 | Stop reason: SDUPTHER

## 2024-08-01 RX ORDER — FLUTICASONE PROPIONATE 50 MCG
1 SPRAY, SUSPENSION (ML) NASAL DAILY
Qty: 11.1 ML | Refills: 1 | Status: SHIPPED | OUTPATIENT
Start: 2024-08-01

## 2024-08-01 NOTE — ASSESSMENT & PLAN NOTE
-s/p RALP 2019  -Followed by Urology at [facility]  -Experiencing symptoms of urinary incontinence. Has been referred to Dr. Paige for eval.

## 2024-08-01 NOTE — PROGRESS NOTES
Name: Raul Phelan      : 1960      MRN: [id number]  Encounter Provider: Dylan Da Silva MD  Encounter Date: 2024   [facility]    Assessment & Plan     1. Essential hypertension  Assessment & Plan:  -Blood pressure well controlled  -Continue current antihypertensive regimen    2. Adenocarcinoma of prostate (HCC)  Assessment & Plan:  -s/p RALP 2019  -Followed by Urology at [facility]  -Experiencing symptoms of urinary incontinence. Has been referred to Dr. Paige for eval.   3. Anxiety  Assessment & Plan:  -Mood stable  -Continue fluoxetine 20mg daily   Orders:  -     FLUoxetine (PROzac) 20 mg capsule; Take 1 capsule (20 mg total) by mouth daily  -     ALPRAZolam (XANAX) 0.25 mg tablet; Take 1 tablet (0.25 mg total) by mouth daily at bedtime as needed for anxiety  4. Panic attacks  -     FLUoxetine (PROzac) 20 mg capsule; Take 1 capsule (20 mg total) by mouth daily  -     ALPRAZolam (XANAX) 0.25 mg tablet; Take 1 tablet (0.25 mg total) by mouth daily at bedtime as needed for anxiety  5. Nephrolithiasis  Assessment & Plan:  -History of recurrent nephrolithiasis. Has been referred to Nephrology for further management by his Urologist.   6. Non-seasonal allergic rhinitis, unspecified trigger  -     fluticasone (FLONASE) 50 mcg/act nasal spray; 1 spray into each nostril daily  7. Insomnia, unspecified type  -     traZODone (DESYREL) 50 mg tablet; Take 1 tablet (50 mg total) by mouth daily at bedtime as needed for sleep  8. Dermatitis  Assessment & Plan:  -Small scabbed over papules possibly due to acne from sweating.  Patient will be given a trial of Cleocin gel to apply as needed.  Orders:  -     clindamycin 1 % gel; Apply topically 2 (two) times a day    9. Ventral abdominal protrusion   Status post mesh repair.  Patient states he has noticed more of a bulge in his abdomen when sitting up then usual.  He denies any associated discomfort.  He plans to  discuss this when he follows up with his urologist who initially placed the mesh to see if it has been in any way compromised.       Subjective      HPI  Patient presents today for chronic follow-up.  His past medical history is notable for prostate cancer status post RALP in 2019, recurrent nephrolithiasis, anxiety with panic attacks and essential hypertension.    Since his last visit patient was seen by urology at which time he notified him he was still experiencing some symptoms of urinary incontinence.  His urologist referred him within the practice to Dr. Paige to discuss a possible sling procedure.  He was also referred to nephrology to discuss medical therapy for his recurrent nephrolithiasis.    Regarding his mood he feels he has been doing much better on the higher dose of fluoxetine.  He rarely requires the use of Xanax for panic attacks.    Today he does report he has been experiencing on occasion bumps on the back of his head.  He states it feels like acne when they first appear.      All other systems negative except for pertinent findings noted in HPI.       Current Outpatient Medications on File Prior to Visit   Medication Sig   • Ascorbic Acid, Vitamin C, (VITAMIN C) 100 MG tablet Take 100 mg by mouth   • Cholecalciferol (VITAMIN D3 PO) Take by mouth   • ketorolac (TORADOL) 10 mg tablet Take 1 tablet (10 mg total) by mouth every 6 (six) hours as needed for moderate pain for up to 10 doses   • Multiple Vitamins-Minerals (ZINC PO) Take by mouth   • Na Sulfate-K Sulfate-Mg Sulf 17.5-3.13-1.6 GM/177ML SOLN Take 1 kit by mouth see administration instructions Follow instructions given at office   • Omega-3 Fatty Acids (FISH OIL PO) Take by mouth   • Red Yeast Rice Extract (RED YEAST RICE PO) Take by mouth   • sildenafil (REVATIO) 20 mg tablet    • [DISCONTINUED] ALPRAZolam (XANAX) 0.25 mg tablet Take 1 tablet (0.25 mg total) by mouth daily at bedtime as needed for anxiety   • [DISCONTINUED] FLUoxetine  "(PROzac) 20 mg capsule Take 1 capsule (20 mg total) by mouth daily   • [DISCONTINUED] fluticasone (FLONASE) 50 mcg/act nasal spray 1 spray into each nostril daily   • [DISCONTINUED] traZODone (DESYREL) 50 mg tablet Take 1 tablet (50 mg total) by mouth daily at bedtime as needed for sleep   • [DISCONTINUED] tamsulosin (FLOMAX) 0.4 mg Take 1 capsule (0.4 mg total) by mouth daily with dinner for 21 days (Patient not taking: Reported on 8/1/2024)       Objective     /82 (BP Location: Left arm, Patient Position: Sitting, Cuff Size: Large)   Pulse 64   Ht 5' 9\" (1.753 m)   Wt 106 kg (232 lb 12.8 oz)   SpO2 99%   BMI 34.38 kg/m²     BP Readings from Last 3 Encounters:   08/01/24 112/82   03/04/24 147/81   02/08/24 152/86        Wt Readings from Last 3 Encounters:   08/01/24 106 kg (232 lb 12.8 oz)   02/08/24 106 kg (233 lb 3.2 oz)   01/31/24 106 kg (233 lb)       Physical Exam    General: NAD  HEENT: NCAT, EOMI, normal conjunctiva  Cardiovascular: RRR, normal S1 and S2, no m/r/g  Pulmonary: Normal respiratory effort, no wheezes, rales or rhonchi  GI: Soft, nontender, nondistended, normoactive bowel sounds, ventral abdominal protrusion  MSK: Normal bulk and tone  Neuro: Non-focal, ambulating without difficulty, non-antalgic gait  Extremities: No lower extremity edema  Skin: 2 small 2 mm scabbed over papules and occipital area    Dylan Da Silva MD  "

## 2024-08-01 NOTE — ASSESSMENT & PLAN NOTE
-History of recurrent nephrolithiasis. Has been referred to Nephrology for further management by his Urologist.

## 2024-08-01 NOTE — ASSESSMENT & PLAN NOTE
-Small scabbed over papules possibly due to acne from sweating.  Patient will be given a trial of Cleocin gel to apply as needed.

## 2024-08-02 NOTE — PROGRESS NOTES
Sleep Study Documentation    Pre-Sleep Study       Sleep testing procedure explained to patient:YES    Patient napped prior to study:NO    Caffeine:Dayshift worker after 12PM.  Caffeine use:NO    Alcohol:Dayshift workers after 5PM: Alcohol use:NO    Typical day for patient:YES       Study Documentation    Sleep Study Indications: Adjust Pressure due to weight loss    Sleep Study: Split Optimal PAP pressure: 8  Leak:None  Snore:Eliminated  REM Obtained:yes  Supplemental O2: no    Minimum SaO2 82  Baseline SaO2 90  PAP mask tried (list all)N30i  PAP mask choice (final)N30i  PAP mask type:nasal  PAP pressure at which snoring was eliminated 8  Minimum SaO2 at final PAP pressure 92  Mode of Therapy:CPAP  ETCO2:No  CPAP changed to BiPAP:No    Mode of Therapy:CPAP    EKG abnormalities: no     EEG abnormalities: no    Were abnormal behaviors in sleep observed:NO    Is Total Sleep Study Recording Time < 2 hours: N/A    Is Total Sleep Study Recording Time > 2 hours but study is incomplete: N/A    Is Total Sleep Study Recording Time 6 hours or more but sleep was not obtained: NO    Patient classification: employed       Post-Sleep Study    Medication used at bedtime or during sleep study:NO    Patient reports time it took to fall asleep:20 to 30 minutes    Patient reports waking up during study:1 to 2 times.  Patient reports returning to sleep in 10 to 30 minutes.    Patient reports sleeping 4 to 6 hours without dreaming.    Does the Patient feel this is a typical night of sleep:typical    Patient rated sleepiness: Somewhat sleepy or tired    PAP treatment:yes: Post PAP treatment patient reports feeling unchanged and would wear PAP mask at home.

## 2024-08-07 LAB — GLUCOSE BLDC GLUCOMTR-MCNC: NORMAL MG/DL

## 2024-08-12 DIAGNOSIS — J30.89 NON-SEASONAL ALLERGIC RHINITIS, UNSPECIFIED TRIGGER: ICD-10-CM

## 2024-08-13 RX ORDER — FLUTICASONE PROPIONATE 50 MCG
1 SPRAY, SUSPENSION (ML) NASAL DAILY
Qty: 11.1 ML | Refills: 0 | Status: SHIPPED | OUTPATIENT
Start: 2024-08-13

## 2024-08-28 ENCOUNTER — TELEPHONE (OUTPATIENT)
Dept: SLEEP CENTER | Facility: CLINIC | Age: 64
End: 2024-08-28

## 2024-08-28 NOTE — TELEPHONE ENCOUNTER
Split night sleep study resulted.  Diagnostic portion confirms mild LISA which appears to be adequately remediated with positive airway pressure.    Per study order, patient to consult sleep medicine.    Call placed to patient, advised of above.    Patient declined to schedule consultation at this time, opting to follow with ordering provider Dr. Rekha abbott.

## 2024-11-07 DIAGNOSIS — J30.89 NON-SEASONAL ALLERGIC RHINITIS, UNSPECIFIED TRIGGER: ICD-10-CM

## 2024-11-08 RX ORDER — FLUTICASONE PROPIONATE 50 MCG
1 SPRAY, SUSPENSION (ML) NASAL DAILY
Qty: 11.1 ML | Refills: 2 | Status: SHIPPED | OUTPATIENT
Start: 2024-11-08

## 2025-05-06 ENCOUNTER — TELEPHONE (OUTPATIENT)
Dept: CARDIOLOGY | Facility: HOSPITAL | Age: 65
End: 2025-05-06
Payer: MEDICARE

## 2025-05-06 NOTE — TELEPHONE ENCOUNTER
Pt's wife stated she begged and pleaded with him to keep this appointment, but she stated he is an adult and in his right mind and can't make him come to the sppointment